# Patient Record
Sex: MALE | Race: WHITE
[De-identification: names, ages, dates, MRNs, and addresses within clinical notes are randomized per-mention and may not be internally consistent; named-entity substitution may affect disease eponyms.]

---

## 2020-11-22 ENCOUNTER — HOSPITAL ENCOUNTER (INPATIENT)
Dept: HOSPITAL 95 - ER | Age: 50
LOS: 1 days | Discharge: HOME | DRG: 143 | End: 2020-11-23
Attending: HOSPITALIST | Admitting: HOSPITALIST
Payer: OTHER GOVERNMENT

## 2020-11-22 VITALS — HEIGHT: 70 IN | WEIGHT: 236 LBS | BODY MASS INDEX: 33.79 KG/M2

## 2020-11-22 DIAGNOSIS — Z79.01: ICD-10-CM

## 2020-11-22 DIAGNOSIS — C62.90: ICD-10-CM

## 2020-11-22 DIAGNOSIS — Z87.891: ICD-10-CM

## 2020-11-22 DIAGNOSIS — J39.1: Primary | ICD-10-CM

## 2020-11-22 DIAGNOSIS — I82.220: ICD-10-CM

## 2020-11-22 DIAGNOSIS — D64.9: ICD-10-CM

## 2020-11-22 LAB
ALBUMIN SERPL BCP-MCNC: 2.9 G/DL (ref 3.4–5)
ALBUMIN/GLOB SERPL: 0.8 {RATIO} (ref 0.8–1.8)
ALT SERPL W P-5'-P-CCNC: 13 U/L (ref 12–78)
ANION GAP SERPL CALCULATED.4IONS-SCNC: 5 MMOL/L (ref 6–16)
AST SERPL W P-5'-P-CCNC: 10 U/L (ref 12–37)
BASOPHILS # BLD: 0.08 K/MM3 (ref 0–0.23)
BASOPHILS NFR BLD: 1 % (ref 0–2)
BILIRUB SERPL-MCNC: 0.5 MG/DL (ref 0.1–1)
BUN SERPL-MCNC: 19 MG/DL (ref 8–24)
CALCIUM SERPL-MCNC: 8.2 MG/DL (ref 8.5–10.1)
CHLORIDE SERPL-SCNC: 98 MMOL/L (ref 98–108)
CO2 SERPL-SCNC: 33 MMOL/L (ref 21–32)
CREAT SERPL-MCNC: 0.77 MG/DL (ref 0.6–1.2)
DEPRECATED RDW RBC AUTO: 53.5 FL (ref 35.1–46.3)
EOSINOPHIL # BLD: 0 K/MM3 (ref 0–0.68)
EOSINOPHIL NFR BLD: 0 % (ref 0–6)
ERYTHROCYTE [DISTWIDTH] IN BLOOD BY AUTOMATED COUNT: 18.1 % (ref 11.7–14.2)
GLOBULIN SER CALC-MCNC: 3.6 G/DL (ref 2.2–4)
GLUCOSE SERPL-MCNC: 106 MG/DL (ref 70–99)
HCT VFR BLD AUTO: 35.3 % (ref 37–53)
HGB BLD-MCNC: 11.2 G/DL (ref 13.5–17.5)
LYMPHOCYTES # BLD: 2.04 K/MM3 (ref 0.84–5.2)
LYMPHOCYTES NFR BLD: 21 % (ref 21–46)
MCHC RBC AUTO-ENTMCNC: 31.7 G/DL (ref 31.5–36.5)
MCV RBC AUTO: 83 FL (ref 80–100)
MONOCYTES # BLD: 0.17 K/MM3 (ref 0.16–1.47)
MONOCYTES NFR BLD: 2 % (ref 4–13)
NEUTS SEG # BLD MANUAL: 6.57 K/MM3 (ref 1.96–9.15)
NEUTS SEG NFR BLD MANUAL: 74 % (ref 41–73)
NRBC # BLD AUTO: 0 K/MM3 (ref 0–0.02)
NRBC BLD AUTO-RTO: 0 /100 WBC (ref 0–0.2)
PLATELET # BLD AUTO: 269 K/MM3 (ref 150–400)
POTASSIUM SERPL-SCNC: 3.5 MMOL/L (ref 3.5–5.5)
PROT SERPL-MCNC: 6.5 G/DL (ref 6.4–8.2)
SODIUM SERPL-SCNC: 136 MMOL/L (ref 136–145)
TOTAL CELLS COUNTED BLD: 100
VARIANT LYMPHS NFR BLD MANUAL: 2 % (ref 0–0)

## 2020-11-22 PROCEDURE — 0C9PXZZ DRAINAGE OF TONSILS, EXTERNAL APPROACH: ICD-10-PCS | Performed by: EMERGENCY MEDICINE

## 2020-11-22 NOTE — NUR
PT IS A 50/M, INDEPENDENT IN THE ROOM. ON ROOM AIR; NO TELE. ALERT AND
ORIENTED AND CAME HERE FOR L PHARANGEAL ABSCESS. DR LUGO WHO IS THE ENT ER.DR
DRAINED 4ML OF ABSCESS; AND FELT BETTER. PT PMH IS TESTICULAR CANCER; HE HAS
SCHEDULED CHEMO FOR TOMORROW BY DR HORNE. PT TAKING SEVERAL PAIN MEDS RELATED
TO CANCER. BED IS IN THE LOWEST POSITION AND CALL LIGHTS WITHIN REACH.

## 2020-11-22 NOTE — NUR
CALL TO HOSPITALIST DR. SUNG. PT HS MS CONTIN D/C'D WITHOUT EXPLAINATION. PT
TAKES THIS DOSE [id number] AT HOME FOR CHRONIC SEVERE PAIN RELATED TO CANCER.
DOSE ADDED BACK ONTO PT MED REGIMEN.

## 2020-11-23 LAB
ALBUMIN SERPL BCP-MCNC: 3.1 G/DL (ref 3.4–5)
ALBUMIN/GLOB SERPL: 0.8 {RATIO} (ref 0.8–1.8)
ALT SERPL W P-5'-P-CCNC: 13 U/L (ref 12–78)
ANION GAP SERPL CALCULATED.4IONS-SCNC: 5 MMOL/L (ref 6–16)
AST SERPL W P-5'-P-CCNC: 3 U/L (ref 12–37)
BASOPHILS # BLD AUTO: 0.04 K/MM3 (ref 0–0.23)
BASOPHILS NFR BLD AUTO: 0 % (ref 0–2)
BILIRUB SERPL-MCNC: 0.5 MG/DL (ref 0.1–1)
BUN SERPL-MCNC: 25 MG/DL (ref 8–24)
CALCIUM SERPL-MCNC: 8.6 MG/DL (ref 8.5–10.1)
CHLORIDE SERPL-SCNC: 100 MMOL/L (ref 98–108)
CO2 SERPL-SCNC: 32 MMOL/L (ref 21–32)
CREAT SERPL-MCNC: 0.8 MG/DL (ref 0.6–1.2)
DEPRECATED RDW RBC AUTO: 53.2 FL (ref 35.1–46.3)
EOSINOPHIL # BLD AUTO: 0.01 K/MM3 (ref 0–0.68)
EOSINOPHIL NFR BLD AUTO: 0 % (ref 0–6)
ERYTHROCYTE [DISTWIDTH] IN BLOOD BY AUTOMATED COUNT: 18.2 % (ref 11.7–14.2)
GLOBULIN SER CALC-MCNC: 4.1 G/DL (ref 2.2–4)
GLUCOSE SERPL-MCNC: 122 MG/DL (ref 70–99)
HCT VFR BLD AUTO: 38.2 % (ref 37–53)
HGB BLD-MCNC: 12.2 G/DL (ref 13.5–17.5)
IMM GRANULOCYTES # BLD AUTO: 0.2 K/MM3 (ref 0–0.1)
IMM GRANULOCYTES NFR BLD AUTO: 2 % (ref 0–1)
LYMPHOCYTES # BLD AUTO: 1.23 K/MM3 (ref 0.84–5.2)
LYMPHOCYTES NFR BLD AUTO: 10 % (ref 21–46)
MAGNESIUM SERPL-MCNC: 2 MG/DL (ref 1.6–2.4)
MCHC RBC AUTO-ENTMCNC: 31.9 G/DL (ref 31.5–36.5)
MCV RBC AUTO: 82 FL (ref 80–100)
MONOCYTES # BLD AUTO: 0.08 K/MM3 (ref 0.16–1.47)
MONOCYTES NFR BLD AUTO: 1 % (ref 4–13)
NEUTROPHILS # BLD AUTO: 10.81 K/MM3 (ref 1.96–9.15)
NEUTROPHILS NFR BLD AUTO: 88 % (ref 41–73)
NRBC # BLD AUTO: 0 K/MM3 (ref 0–0.02)
NRBC BLD AUTO-RTO: 0 /100 WBC (ref 0–0.2)
PLATELET # BLD AUTO: 291 K/MM3 (ref 150–400)
POTASSIUM SERPL-SCNC: 3.4 MMOL/L (ref 3.5–5.5)
PROT SERPL-MCNC: 7.2 G/DL (ref 6.4–8.2)
SODIUM SERPL-SCNC: 137 MMOL/L (ref 136–145)

## 2020-11-23 NOTE — NUR
SHIFT SUMMARY:
VSS. AFEB. AAOX4. ABLE TO COMMUNICATE NEEDS. DILAUDID X1 FOR BREAKTHROUGH PAIN
TONIGHT. PT STATES THROAT IS ONLY SOMEWHAT SORE, FEELS MUCH BETTER SINCE I & D
ON 11/22. PAIN IS IN PT'S "USUAL" LOCATION WHICH IS ALL OVER BODY AND
SPECIFICALLY LOWER BACK. APPEARS TO BE SLEEPING WELL THROUGH MUCH OF THE
NIGHT. IV ABT INFUSED PER ORDERS. PT PLANS TO HAVE CHEMO PER ROUTINE SCHEDULE
TODAY. NO ACUTE CHANGES OVERNIGHT. WILL CONT TO MONITOR.

## 2020-11-23 NOTE — NUR
PATIENT D/C'D TO HOME. DC INSTRUCTION AND EDUCATION DISUCUSSED WITH PATIENT
AND COPY PROVIDED. PATIENT DID NOT WANT THE RX FOR BISACODYL SUPPOSITORY SO IT
WAS NOT SENT TO HIS PHARMACY. MESSAGE LEFT AT DR. SWANSON'S OFFICE TO CALL
PATIENT WITH APPT TIME AND DATE. PATIENT DENIES ANY FURTHER QUESITONS AND
CONCENRS.

## 2020-11-25 ENCOUNTER — HOSPITAL ENCOUNTER (OUTPATIENT)
Dept: HOSPITAL 95 - ATC | Age: 50
Discharge: HOME | End: 2020-11-25
Attending: INTERNAL MEDICINE
Payer: OTHER GOVERNMENT

## 2020-11-25 DIAGNOSIS — Z79.899: ICD-10-CM

## 2020-11-25 DIAGNOSIS — C77.2: ICD-10-CM

## 2020-11-25 DIAGNOSIS — C62.11: Primary | ICD-10-CM

## 2020-11-25 DIAGNOSIS — Z79.01: ICD-10-CM

## 2020-12-01 ENCOUNTER — HOSPITAL ENCOUNTER (OUTPATIENT)
Dept: HOSPITAL 95 - ATC | Age: 50
Discharge: HOME | End: 2020-12-01
Attending: INTERNAL MEDICINE
Payer: OTHER GOVERNMENT

## 2020-12-01 DIAGNOSIS — C77.2: ICD-10-CM

## 2020-12-01 DIAGNOSIS — Z79.899: ICD-10-CM

## 2020-12-01 DIAGNOSIS — C62.11: ICD-10-CM

## 2020-12-01 DIAGNOSIS — Z79.01: ICD-10-CM

## 2020-12-01 DIAGNOSIS — Z48.00: Primary | ICD-10-CM

## 2020-12-01 DIAGNOSIS — Z91.018: ICD-10-CM

## 2020-12-07 ENCOUNTER — HOSPITAL ENCOUNTER (OUTPATIENT)
Dept: HOSPITAL 95 - ATC | Age: 50
Discharge: HOME | End: 2020-12-07
Attending: INTERNAL MEDICINE
Payer: OTHER GOVERNMENT

## 2020-12-07 DIAGNOSIS — Z79.899: ICD-10-CM

## 2020-12-07 DIAGNOSIS — Z79.01: ICD-10-CM

## 2020-12-07 DIAGNOSIS — Z45.2: Primary | ICD-10-CM

## 2020-12-07 DIAGNOSIS — C78.00: ICD-10-CM

## 2020-12-07 DIAGNOSIS — C78.6: ICD-10-CM

## 2020-12-07 DIAGNOSIS — Z48.00: ICD-10-CM

## 2020-12-07 DIAGNOSIS — C62.11: ICD-10-CM

## 2020-12-07 DIAGNOSIS — C77.2: ICD-10-CM

## 2020-12-07 LAB
ALBUMIN SERPL BCP-MCNC: 2.9 G/DL (ref 3.4–5)
ALBUMIN/GLOB SERPL: 0.8 {RATIO} (ref 0.8–1.8)
ALT SERPL W P-5'-P-CCNC: 13 U/L (ref 12–78)
ANION GAP SERPL CALCULATED.4IONS-SCNC: 4 MMOL/L (ref 6–16)
AST SERPL W P-5'-P-CCNC: 14 U/L (ref 12–37)
BASOPHILS # BLD: 0.08 K/MM3 (ref 0–0.23)
BASOPHILS NFR BLD: 2 % (ref 0–2)
BILIRUB SERPL-MCNC: 0.2 MG/DL (ref 0.1–1)
BUN SERPL-MCNC: 18 MG/DL (ref 8–24)
CALCIUM SERPL-MCNC: 8.9 MG/DL (ref 8.5–10.1)
CHLORIDE SERPL-SCNC: 108 MMOL/L (ref 98–108)
CO2 SERPL-SCNC: 29 MMOL/L (ref 21–32)
CREAT SERPL-MCNC: 0.88 MG/DL (ref 0.6–1.2)
DEPRECATED RDW RBC AUTO: 61 FL (ref 35.1–46.3)
EOSINOPHIL # BLD: 0 K/MM3 (ref 0–0.68)
EOSINOPHIL NFR BLD: 0 % (ref 0–6)
ERYTHROCYTE [DISTWIDTH] IN BLOOD BY AUTOMATED COUNT: 19.9 % (ref 11.7–14.2)
GLOBULIN SER CALC-MCNC: 3.8 G/DL (ref 2.2–4)
GLUCOSE SERPL-MCNC: 135 MG/DL (ref 70–99)
HCT VFR BLD AUTO: 36.3 % (ref 37–53)
HGB BLD-MCNC: 11 G/DL (ref 13.5–17.5)
LYMPHOCYTES # BLD: 2.59 K/MM3 (ref 0.84–5.2)
LYMPHOCYTES NFR BLD: 62 % (ref 21–46)
MCHC RBC AUTO-ENTMCNC: 30.3 G/DL (ref 31.5–36.5)
MCV RBC AUTO: 86 FL (ref 80–100)
METAMYELOCYTES # BLD MANUAL: 0.16 K/MM3 (ref 0–0)
METAMYELOCYTES NFR BLD MANUAL: 4 % (ref 0–0)
MONOCYTES # BLD: 0.54 K/MM3 (ref 0.16–1.47)
MONOCYTES NFR BLD: 13 % (ref 4–13)
MYELOCYTES # BLD MANUAL: 0.12 K/MM3 (ref 0–0)
MYELOCYTES NFR BLD MANUAL: 3 % (ref 0–0)
NEUTS SEG # BLD MANUAL: 0.66 K/MM3 (ref 1.96–9.15)
NEUTS SEG NFR BLD MANUAL: 16 % (ref 41–73)
NRBC # BLD AUTO: 0.02 K/MM3 (ref 0–0.02)
NRBC BLD AUTO-RTO: 0.5 /100 WBC (ref 0–0.2)
PLATELET # BLD AUTO: 258 K/MM3 (ref 150–400)
POTASSIUM SERPL-SCNC: 3.9 MMOL/L (ref 3.5–5.5)
PROT SERPL-MCNC: 6.7 G/DL (ref 6.4–8.2)
SODIUM SERPL-SCNC: 141 MMOL/L (ref 136–145)
TOTAL CELLS COUNTED BLD: 100

## 2020-12-14 ENCOUNTER — HOSPITAL ENCOUNTER (OUTPATIENT)
Dept: HOSPITAL 95 - ATC | Age: 50
Discharge: HOME | End: 2020-12-14
Attending: INTERNAL MEDICINE
Payer: OTHER GOVERNMENT

## 2020-12-14 DIAGNOSIS — C62.11: ICD-10-CM

## 2020-12-14 DIAGNOSIS — Z48.00: Primary | ICD-10-CM

## 2020-12-14 DIAGNOSIS — Z45.2: ICD-10-CM

## 2020-12-14 DIAGNOSIS — Z79.01: ICD-10-CM

## 2020-12-14 DIAGNOSIS — C78.6: ICD-10-CM

## 2020-12-14 DIAGNOSIS — C77.2: ICD-10-CM

## 2020-12-14 DIAGNOSIS — C78.00: ICD-10-CM

## 2020-12-14 DIAGNOSIS — Z79.899: ICD-10-CM

## 2020-12-14 DIAGNOSIS — Z87.891: ICD-10-CM

## 2020-12-14 DIAGNOSIS — Z91.018: ICD-10-CM

## 2020-12-14 LAB
ALBUMIN SERPL BCP-MCNC: 3.1 G/DL (ref 3.4–5)
ALBUMIN/GLOB SERPL: 0.8 {RATIO} (ref 0.8–1.8)
ALT SERPL W P-5'-P-CCNC: 15 U/L (ref 12–78)
ANION GAP SERPL CALCULATED.4IONS-SCNC: 4 MMOL/L (ref 6–16)
AST SERPL W P-5'-P-CCNC: 13 U/L (ref 12–37)
BASOPHILS # BLD: 0 K/MM3 (ref 0–0.23)
BASOPHILS NFR BLD: 0 % (ref 0–2)
BILIRUB SERPL-MCNC: 0.4 MG/DL (ref 0.1–1)
BUN SERPL-MCNC: 27 MG/DL (ref 8–24)
CALCIUM SERPL-MCNC: 8.6 MG/DL (ref 8.5–10.1)
CHLORIDE SERPL-SCNC: 103 MMOL/L (ref 98–108)
CO2 SERPL-SCNC: 30 MMOL/L (ref 21–32)
CREAT SERPL-MCNC: 0.89 MG/DL (ref 0.6–1.2)
DEPRECATED RDW RBC AUTO: 61.8 FL (ref 35.1–46.3)
EOSINOPHIL # BLD: 0 K/MM3 (ref 0–0.68)
EOSINOPHIL NFR BLD: 0 % (ref 0–6)
ERYTHROCYTE [DISTWIDTH] IN BLOOD BY AUTOMATED COUNT: 19.7 % (ref 11.7–14.2)
GLOBULIN SER CALC-MCNC: 3.7 G/DL (ref 2.2–4)
GLUCOSE SERPL-MCNC: 118 MG/DL (ref 70–99)
HCT VFR BLD AUTO: 36.3 % (ref 37–53)
HGB BLD-MCNC: 11.1 G/DL (ref 13.5–17.5)
LYMPHOCYTES # BLD: 1.03 K/MM3 (ref 0.84–5.2)
LYMPHOCYTES NFR BLD: 16 % (ref 21–46)
MCHC RBC AUTO-ENTMCNC: 30.6 G/DL (ref 31.5–36.5)
MCV RBC AUTO: 86 FL (ref 80–100)
MONOCYTES # BLD: 0.06 K/MM3 (ref 0.16–1.47)
MONOCYTES NFR BLD: 1 % (ref 4–13)
NEUTS BAND NFR BLD MANUAL: 2 % (ref 0–8)
NEUTS SEG # BLD MANUAL: 5.36 K/MM3 (ref 1.96–9.15)
NEUTS SEG NFR BLD MANUAL: 81 % (ref 41–73)
NRBC # BLD AUTO: 0 K/MM3 (ref 0–0.02)
NRBC BLD AUTO-RTO: 0 /100 WBC (ref 0–0.2)
PLATELET # BLD AUTO: 221 K/MM3 (ref 150–400)
POTASSIUM SERPL-SCNC: 4.4 MMOL/L (ref 3.5–5.5)
PROT SERPL-MCNC: 6.8 G/DL (ref 6.4–8.2)
SODIUM SERPL-SCNC: 137 MMOL/L (ref 136–145)
TOTAL CELLS COUNTED BLD: 100

## 2020-12-21 ENCOUNTER — HOSPITAL ENCOUNTER (OUTPATIENT)
Dept: HOSPITAL 95 - ATC | Age: 50
Discharge: HOME | End: 2020-12-21
Attending: INTERNAL MEDICINE
Payer: OTHER GOVERNMENT

## 2020-12-21 DIAGNOSIS — C78.6: ICD-10-CM

## 2020-12-21 DIAGNOSIS — C78.00: ICD-10-CM

## 2020-12-21 DIAGNOSIS — Z79.899: ICD-10-CM

## 2020-12-21 DIAGNOSIS — Z91.018: ICD-10-CM

## 2020-12-21 DIAGNOSIS — C62.11: ICD-10-CM

## 2020-12-21 DIAGNOSIS — Z79.01: ICD-10-CM

## 2020-12-21 DIAGNOSIS — Z87.891: ICD-10-CM

## 2020-12-21 DIAGNOSIS — C77.2: ICD-10-CM

## 2020-12-21 DIAGNOSIS — Z48.00: Primary | ICD-10-CM

## 2021-01-01 ENCOUNTER — HOSPITAL ENCOUNTER (INPATIENT)
Dept: HOSPITAL 95 - ER | Age: 51
LOS: 19 days | DRG: 207 | End: 2021-01-20
Attending: HOSPITALIST | Admitting: HOSPITALIST
Payer: OTHER GOVERNMENT

## 2021-01-01 VITALS — HEIGHT: 70 IN | WEIGHT: 215.61 LBS | BODY MASS INDEX: 30.87 KG/M2

## 2021-01-01 DIAGNOSIS — E44.0: ICD-10-CM

## 2021-01-01 DIAGNOSIS — Z86.718: ICD-10-CM

## 2021-01-01 DIAGNOSIS — D63.0: ICD-10-CM

## 2021-01-01 DIAGNOSIS — J96.01: ICD-10-CM

## 2021-01-01 DIAGNOSIS — J16.8: Primary | ICD-10-CM

## 2021-01-01 DIAGNOSIS — Z51.5: ICD-10-CM

## 2021-01-01 DIAGNOSIS — I48.0: ICD-10-CM

## 2021-01-01 DIAGNOSIS — Z66: ICD-10-CM

## 2021-01-01 DIAGNOSIS — Z78.1: ICD-10-CM

## 2021-01-01 DIAGNOSIS — Z87.891: ICD-10-CM

## 2021-01-01 DIAGNOSIS — C62.90: ICD-10-CM

## 2021-01-01 LAB
ALBUMIN SERPL BCP-MCNC: 2.2 G/DL (ref 3.4–5)
ALBUMIN/GLOB SERPL: 0.5 {RATIO} (ref 0.8–1.8)
ALT SERPL W P-5'-P-CCNC: 9 U/L (ref 12–78)
ANION GAP SERPL CALCULATED.4IONS-SCNC: 5 MMOL/L (ref 6–16)
AST SERPL W P-5'-P-CCNC: 11 U/L (ref 12–37)
BASOPHILS # BLD AUTO: 0.05 K/MM3 (ref 0–0.23)
BASOPHILS NFR BLD AUTO: 0 % (ref 0–2)
BILIRUB SERPL-MCNC: 0.5 MG/DL (ref 0.1–1)
BUN SERPL-MCNC: 7 MG/DL (ref 8–24)
CALCIUM SERPL-MCNC: 8.7 MG/DL (ref 8.5–10.1)
CHLORIDE SERPL-SCNC: 104 MMOL/L (ref 98–108)
CO2 SERPL-SCNC: 30 MMOL/L (ref 21–32)
CREAT SERPL-MCNC: 0.89 MG/DL (ref 0.6–1.2)
DEPRECATED RDW RBC AUTO: 59.2 FL (ref 35.1–46.3)
EOSINOPHIL # BLD AUTO: 0.15 K/MM3 (ref 0–0.68)
EOSINOPHIL NFR BLD AUTO: 1 % (ref 0–6)
ERYTHROCYTE [DISTWIDTH] IN BLOOD BY AUTOMATED COUNT: 19.2 % (ref 11.7–14.2)
GLOBULIN SER CALC-MCNC: 4.7 G/DL (ref 2.2–4)
GLUCOSE SERPL-MCNC: 129 MG/DL (ref 70–99)
HCT VFR BLD AUTO: 19.3 % (ref 37–53)
HCT VFR BLD AUTO: 27.9 % (ref 37–53)
HGB BLD-MCNC: 6.1 G/DL (ref 13.5–17.5)
HGB BLD-MCNC: 8.9 G/DL (ref 13.5–17.5)
IMM GRANULOCYTES # BLD AUTO: 0.59 K/MM3 (ref 0–0.1)
IMM GRANULOCYTES NFR BLD AUTO: 5 % (ref 0–1)
LYMPHOCYTES # BLD AUTO: 1.3 K/MM3 (ref 0.84–5.2)
LYMPHOCYTES NFR BLD AUTO: 12 % (ref 21–46)
MCHC RBC AUTO-ENTMCNC: 31.6 G/DL (ref 31.5–36.5)
MCV RBC AUTO: 84 FL (ref 80–100)
MONOCYTES # BLD AUTO: 1.37 K/MM3 (ref 0.16–1.47)
MONOCYTES NFR BLD AUTO: 12 % (ref 4–13)
NEUTROPHILS # BLD AUTO: 7.78 K/MM3 (ref 1.96–9.15)
NEUTROPHILS NFR BLD AUTO: 69 % (ref 41–73)
NRBC # BLD AUTO: 0.04 K/MM3 (ref 0–0.02)
NRBC BLD AUTO-RTO: 0.4 /100 WBC (ref 0–0.2)
PLATELET # BLD AUTO: 292 K/MM3 (ref 150–400)
POTASSIUM SERPL-SCNC: 3.4 MMOL/L (ref 3.5–5.5)
PROT SERPL-MCNC: 6.9 G/DL (ref 6.4–8.2)
SODIUM SERPL-SCNC: 139 MMOL/L (ref 136–145)

## 2021-01-01 PROCEDURE — U0003 INFECTIOUS AGENT DETECTION BY NUCLEIC ACID (DNA OR RNA); SEVERE ACUTE RESPIRATORY SYNDROME CORONAVIRUS 2 (SARS-COV-2) (CORONAVIRUS DISEASE [COVID-19]), AMPLIFIED PROBE TECHNIQUE, MAKING USE OF HIGH THROUGHPUT TECHNOLOGIES AS DESCRIBED BY CMS-2020-01-R: HCPCS

## 2021-01-01 PROCEDURE — C1751 CATH, INF, PER/CENT/MIDLINE: HCPCS

## 2021-01-01 PROCEDURE — C9113 INJ PANTOPRAZOLE SODIUM, VIA: HCPCS

## 2021-01-01 PROCEDURE — 30233N1 TRANSFUSION OF NONAUTOLOGOUS RED BLOOD CELLS INTO PERIPHERAL VEIN, PERCUTANEOUS APPROACH: ICD-10-PCS | Performed by: HOSPITALIST

## 2021-01-01 PROCEDURE — A9270 NON-COVERED ITEM OR SERVICE: HCPCS

## 2021-01-01 PROCEDURE — P9016 RBC LEUKOCYTES REDUCED: HCPCS

## 2021-01-01 NOTE — NUR
PT ADMITTED FROM ED 1510, A/O X4, AMBULATED TO BED- IRRITABLE, TOOK OFF O2 AND
WENT TO THE BATHROOM AFTER RN LEFT ROOM TO GET SOMETHING. MOM WAS IN THE ROOM
AT THE TIME. PT CAME BACK TO BED SEVERELY DYSPNIC, TURNED OXYMIZER UP FROM 8L
TO 15L TEMPERARY, SATS SEEN AS LOW AS 79%, TOOK 10 MIN TO COME BACK INTO LOW
90'S, LOWERED O2 BACK DOWN TO 10L OXYMIZER. PT SEELING BETTER, LUNGS CRACKEL
IN BASES AND TIGHT. CALLED RT TO SET UP CONT PULSE OX. VSS, WILL CONT TO NIECY
BLOOD TX WHILE GATHERING HISTORY.

## 2021-01-01 NOTE — NUR
TRANSFER NOTE
PT TX FROM MED FLOOR TO ICU FOR WORSENING DYSPNEA. PT ABLE TO STAND AND
TRANSFER TO ICU BED, BECOMES DYSPNEIC WITH MINIMAL EXERTION, SATS DECREASED TO
LOW 80'S WITH EXERTION. CURRENTLY ON ISOLATION FOR COVID PRECAUTIONS, PENDING
RESULTS FROM SENDOUT. RT IN ROOM UPON PT ARRIVAL, PLACED ON AIRVO, PT
TOLERATING WELL C O2 SATS >90%. LS DIMINISHED T/O. NSR ON THE MONITOR C
OCCASIONAL PVC'S. PT ORIENTED TO ROOM, NEEDS ADDRESSED. NO COMPLAINTS AT THIS
TIME.  WILL CONTINUE TO MONITOR AND REASSESS.

## 2021-01-01 NOTE — NUR
SUMMARY- PT A/O X3, USES CALL LIGHT. USING URINAL. TOLERATING FOOD AND FLUIDS.
MEDICATED WITH DILAUDID X1 1800 FOR BACK PAIN 6/10 WITH STATED RELEIF. I UNIT
OF PRBC'S COMPLETE. PT GETS SEVERE SOB WITH EXERTION AND DESATS, O2 DEMAND
INCREASES FROM 10-15L AND TAKES 10 MIN TO RECOVER FROM EPISODES. VSS, TEMP
99.9 AFTER BLOOD. REPORTED ALL TO DRAGAN FERNANDEZ RN.

## 2021-01-01 NOTE — NUR
1950 REPORT RECEIVED FROM BLANCHE BRAVO; PT O2 SAT 79% ON 15L OXIMIZER; RT AT
SIDE; PT VERY ANXIOUS AND UNABLE TO CATCH BREATH WHILE SITTING HIGH FOWLERS.
 
2010 PRITI BARKSDALE RN AT BEDSIDE WITH ORDERS TO MOVE PT TO ICU; PRITI WILL
INPUT TRANSFER ORDERS.
 
2035 REPORT GIVEN TO BLANCHE MORENO IN ICU; PT TO BE MOVED TO ICU-3.

## 2021-01-02 LAB
ALBUMIN SERPL BCP-MCNC: 1.9 G/DL (ref 3.4–5)
ALBUMIN/GLOB SERPL: 0.4 {RATIO} (ref 0.8–1.8)
ALT SERPL W P-5'-P-CCNC: 7 U/L (ref 12–78)
ANION GAP SERPL CALCULATED.4IONS-SCNC: 3 MMOL/L (ref 6–16)
AST SERPL W P-5'-P-CCNC: 14 U/L (ref 12–37)
BASOPHILS # BLD AUTO: 0.08 K/MM3 (ref 0–0.23)
BASOPHILS NFR BLD AUTO: 1 % (ref 0–2)
BILIRUB SERPL-MCNC: 0.5 MG/DL (ref 0.1–1)
BUN SERPL-MCNC: 10 MG/DL (ref 8–24)
CALCIUM SERPL-MCNC: 8.6 MG/DL (ref 8.5–10.1)
CHLORIDE SERPL-SCNC: 106 MMOL/L (ref 98–108)
CO2 SERPL-SCNC: 31 MMOL/L (ref 21–32)
CREAT SERPL-MCNC: 1.02 MG/DL (ref 0.6–1.2)
DEPRECATED RDW RBC AUTO: 55.8 FL (ref 35.1–46.3)
EOSINOPHIL # BLD AUTO: 0.17 K/MM3 (ref 0–0.68)
EOSINOPHIL NFR BLD AUTO: 2 % (ref 0–6)
ERYTHROCYTE [DISTWIDTH] IN BLOOD BY AUTOMATED COUNT: 18.6 % (ref 11.7–14.2)
FERRITIN SERPL-MCNC: 491 NG/ML (ref 26–388)
GLOBULIN SER CALC-MCNC: 4.3 G/DL (ref 2.2–4)
GLUCOSE SERPL-MCNC: 100 MG/DL (ref 70–99)
HCT VFR BLD AUTO: 26.8 % (ref 37–53)
HGB BLD-MCNC: 8.6 G/DL (ref 13.5–17.5)
IMM GRANULOCYTES # BLD AUTO: 0.39 K/MM3 (ref 0–0.1)
IMM GRANULOCYTES NFR BLD AUTO: 4 % (ref 0–1)
LYMPHOCYTES # BLD AUTO: 1.47 K/MM3 (ref 0.84–5.2)
LYMPHOCYTES NFR BLD AUTO: 16 % (ref 21–46)
MAGNESIUM SERPL-MCNC: 1.8 MG/DL (ref 1.6–2.4)
MCHC RBC AUTO-ENTMCNC: 32.1 G/DL (ref 31.5–36.5)
MCV RBC AUTO: 84 FL (ref 80–100)
MONOCYTES # BLD AUTO: 1.16 K/MM3 (ref 0.16–1.47)
MONOCYTES NFR BLD AUTO: 13 % (ref 4–13)
NEUTROPHILS # BLD AUTO: 5.88 K/MM3 (ref 1.96–9.15)
NEUTROPHILS NFR BLD AUTO: 64 % (ref 41–73)
NRBC # BLD AUTO: 0.05 K/MM3 (ref 0–0.02)
NRBC BLD AUTO-RTO: 0.5 /100 WBC (ref 0–0.2)
PH BLDA: 7.42 [PH] (ref 7.35–7.45)
PLATELET # BLD AUTO: 191 K/MM3 (ref 150–400)
POTASSIUM SERPL-SCNC: 4 MMOL/L (ref 3.5–5.5)
PROT SERPL-MCNC: 6.2 G/DL (ref 6.4–8.2)
PROT UR STRIP-MCNC: (no result) MG/DL
PROT UR STRIP-MCNC: (no result) MG/DL
SODIUM SERPL-SCNC: 140 MMOL/L (ref 136–145)
SP GR SPEC: 1.01 (ref 1–1.02)
SP GR SPEC: 1.01 (ref 1–1.02)
TIBC SERPL-MCNC: 143 UG/DL (ref 250–450)
UROBILINOGEN UR STRIP-MCNC: (no result) MG/DL
UROBILINOGEN UR STRIP-MCNC: (no result) MG/DL

## 2021-01-02 PROCEDURE — XW033E5 INTRODUCTION OF REMDESIVIR ANTI-INFECTIVE INTO PERIPHERAL VEIN, PERCUTANEOUS APPROACH, NEW TECHNOLOGY GROUP 5: ICD-10-PCS | Performed by: HOSPITALIST

## 2021-01-02 NOTE — NUR
SHIFT SUMMARY
PT REMAINS ALERT AND ORIENTED. TOLERATING AIRVO WELL @ 50L/ 70% c O2 SATS
>90%. PT FREQUENTLY REPOSITIONS SELF IN BED. STATES HE IS FEELING BETTER NOW
THAT HE IS NO LONGER SHOB AND IS ABLE TO TOLERATE THE AIRVO. PT ABLE TO USE
THE URINAL WITHOUT ASSISTANCE. CONTINUES TO HAVE PVC'S/ BIGEMINY. NO C/O CP.
PT CURRENTLY HAS NO COMPLAINTS. PT NOTIFIED MOTHER OF TRANSFER TO ICU, STATED
NO NEED FOR ME TO CALL AND MAKE HER WORRY. WILL CONTINUE TO MONITOR.

## 2021-01-02 NOTE — NUR
PT UPDATE...
 
AT APROX 1315 PT WAS EATING LUNCH, HIS O2 SATS DROPPED SIGNIFICANTLY DOWN TO
THE 70'S. PT WAS STOPPED FROM FINISHING EATING AND INSTRUCTED TO TAKE DEEP
BREATHS IN THROUGH HIS NOSE AND OUT OF HIS MOUTH, THE AIRVO WAS INCREASED TO
95% FIO2 AND HIS O2 SATS STILL DID NOT RECOVER. RT WAS CALLED AND A BIPAP WAS
BROUGHT INTO THE ROOM PT WAS PLACED ON THE CPAP SETTINGS AT 12 AND 85% FIO2.
HIS O2 SATS QUICKLY IMPROVED AND ARE STAYING >90%.
 
WILL CONTINUE TO MONITOR.

## 2021-01-02 NOTE — NUR
PT UPDATE....
 
PT IS NOW BACK ON THE AIRVO AT 50L AND 85% FIO2 KEEPING HIS O2 SATS>90% PT
REQUESTED TO TAKE THE CPAP MASK OFF FOR A BREAK. PT WAS MEDICATED FOR PAIN PER
EMAR. WILL CONTINUE TO MONITOR.

## 2021-01-02 NOTE — NUR
SHIFT SUMMARY....
 
AT 1730 THIS RN WAS IN THE PT'S ROOM GIVING 1700 MEDS AND DINNER, THE PT WAS
STABLE ON THE AIRVO AT 50L AND 85% FIO2 WITH O2 SATS>90% UNTIL HE TURNED
HIMSELF OVER IN THE BED AND PUSHED HIMSELF UP THE BED TO THE SITTING POSITION,
THIS MADE HIS SATS DROP DOWN TO THE 70'S AND HE WAS UNABLE TO RECOVER ON THE
AIRVO, THE CPAP WAS PUT ON AND WAS SET TO 12 % FIO2, PT WAS STILL
UNABLE TO RECOVER, RT WAS CALLED AND THE CPAP SETTINGS WERE CHANGED TO BIPAP
AT 14/8 %. PT HAS BEEN ON THIS SETTING ARPROX 10 MINS WITH O2 SATS
BETWEEN 82%-92%. DR. ALFARO UPDATED AND ORDER FOR PRECEDEX WAS OBTAINED. L/S
DIM T/O OTHER VS STABLE AT THIS TIME.
 
WILL CONTINUE TO MONITOR.

## 2021-01-02 NOTE — NUR
UPDATE
NOTIFIED DR ALFARO OF ABG RESULTS, DISCUSSED INABLITITY TO TAKE PO MEDICATIONS
AND PTS HYPOTENSION RELATING TO PAIN MANAGEMENT. FLUID BOLUS AND CONTINOUS NS
INFUSION ORDERED. WILL MONITOR PT TOLERANCE.

## 2021-01-02 NOTE — NUR
PT UPDATE...
 
RT WAS CALLED BACK IN THE ROOM D/T THE PT'S O2 SATS STILL BEING BELOW 88%
THE PRECEDEX WAS STARTED AND 50 MCG OF FENTANYL WAS GIVEN. THE RT CHANGED THE
BIPAP SETTINGS TO 12/14 % FIO2 CURRENTLY THE PT'S O2 SATS ARE 92%. THE
PT IS ON 0.3MCG OF PRECEDEX HE IS DROWSY BUT RESPONDS TO VERBAL STIMULI.

## 2021-01-02 NOTE — NUR
AM NOTE...
 
ASSUMED CARE OF PT APROX 0700, PT IS A&Ox4 AND WAS ADMITTED FOR PNA PT IS ON
THE AIRVO AT 50L AND 73% FIO2. PT'S O2 SATS WERE >90% BUT PT WOULD DESAT
QUICKLY DOWN TO THE LOW 80'S% WITH ACTIVITY AND COUGHING, AT AROUND 0900 PT
DESATTED AND WAS UNABLE TO RECOVER HIS FIO2 WAS INCREASED FROM 73% TO 85% AND
HIS O2 SATS WERE AT 91%. PT'S OTHER VS STABLE PT IS IN NSR W/OCC BIGEMINAL
PVCS. PT DENIES ANY CHEST PAIN/PRESSURE OR N/V. NO EDEMA NOTED ON ASSESSMENT.
BT PRESENT AND HYPOACTIVE, ABD IS TENDER TO PALP PER THE PT.
 
CALL LIGHT IN REACH WILL CONTINUE TO MONITOR.

## 2021-01-02 NOTE — NUR
PT UPDATE...
 
THIS RN IN THE ROOM TO GIVE MEDICATIONS, PT'S O2 SATS WERE DROPPING BUT THE
WAVE FORM WAS NOT GOOD, THE PROBE WAS PLACED ON THE PT'S TOE WITH A GOOD WAVE
FORM. PT'S O2 SATS WERE IN THE LOW TO MID 80'S, THE FIO2 OF THE AIRVO WAS
INCREASED FROM 75% FIO2 TO 90%, CURRENTLY THE PT'S O2 SATS ARE BETWEEN 89-94%
DR. ALFARO WAS CONSULTED AND MADE AWARE OF THE PT.
 
WILL CONTINUE TO MONITOR.

## 2021-01-02 NOTE — NUR
SHIFT ASSESSMENT
BEDSIDE REPORT RECV'D FROM TOMAS RN @ 1900. PT ALERT AND ORIENTED, DIFFICULT
TO UNDERSTAND DUE TO CPAP AND THIS NURSES PAPR.  FOLLOWING ALL COMMANDS.
TOLERATING CPAP FAIRLY WELL c O2 SATS > 88%. CPAP SETTINGS ARE BEING TITRATED.
NS/SINUS THUY ON THE CARDIAC MONITOR. PT DENIES ANY CP. VALENTINE CATH PATENT,
DRAINING YELLOW URINE. PRECEDEX GTT @ 0.5, NS TKO. DISCUSSING RESPIRATORY
CONCERNS WITH RT AND DR ALFARO, WILL CONTINUE TO MONITOR CLOSELY.

## 2021-01-03 LAB
ALBUMIN SERPL BCP-MCNC: 1.9 G/DL (ref 3.4–5)
ALBUMIN/GLOB SERPL: 0.4 {RATIO} (ref 0.8–1.8)
ALT SERPL W P-5'-P-CCNC: 9 U/L (ref 12–78)
ANION GAP SERPL CALCULATED.4IONS-SCNC: 9 MMOL/L (ref 6–16)
AST SERPL W P-5'-P-CCNC: 19 U/L (ref 12–37)
BASOPHILS # BLD AUTO: 0.04 K/MM3 (ref 0–0.23)
BASOPHILS NFR BLD AUTO: 0 % (ref 0–2)
BILIRUB SERPL-MCNC: 0.5 MG/DL (ref 0.1–1)
BUN SERPL-MCNC: 15 MG/DL (ref 8–24)
CALCIUM SERPL-MCNC: 8.4 MG/DL (ref 8.5–10.1)
CHLORIDE SERPL-SCNC: 107 MMOL/L (ref 98–108)
CO2 SERPL-SCNC: 25 MMOL/L (ref 21–32)
CREAT SERPL-MCNC: 0.91 MG/DL (ref 0.6–1.2)
DEPRECATED RDW RBC AUTO: 56.5 FL (ref 35.1–46.3)
EOSINOPHIL # BLD AUTO: 0 K/MM3 (ref 0–0.68)
EOSINOPHIL NFR BLD AUTO: 0 % (ref 0–6)
ERYTHROCYTE [DISTWIDTH] IN BLOOD BY AUTOMATED COUNT: 18.6 % (ref 11.7–14.2)
GLOBULIN SER CALC-MCNC: 4.8 G/DL (ref 2.2–4)
GLUCOSE SERPL-MCNC: 123 MG/DL (ref 70–99)
HCT VFR BLD AUTO: 27.5 % (ref 37–53)
HGB BLD-MCNC: 8.6 G/DL (ref 13.5–17.5)
IMM GRANULOCYTES # BLD AUTO: 0.32 K/MM3 (ref 0–0.1)
IMM GRANULOCYTES NFR BLD AUTO: 2 % (ref 0–1)
LYMPHOCYTES # BLD AUTO: 1.05 K/MM3 (ref 0.84–5.2)
LYMPHOCYTES NFR BLD AUTO: 7 % (ref 21–46)
MAGNESIUM SERPL-MCNC: 1.8 MG/DL (ref 1.6–2.4)
MCHC RBC AUTO-ENTMCNC: 31.3 G/DL (ref 31.5–36.5)
MCV RBC AUTO: 83 FL (ref 80–100)
MONOCYTES # BLD AUTO: 0.99 K/MM3 (ref 0.16–1.47)
MONOCYTES NFR BLD AUTO: 7 % (ref 4–13)
NEUTROPHILS # BLD AUTO: 12.46 K/MM3 (ref 1.96–9.15)
NEUTROPHILS NFR BLD AUTO: 84 % (ref 41–73)
NRBC # BLD AUTO: 0 K/MM3 (ref 0–0.02)
NRBC BLD AUTO-RTO: 0 /100 WBC (ref 0–0.2)
PH BLDA: 7.35 [PH] (ref 7.35–7.45)
PHOSPHATE SERPL-MCNC: 4.1 MG/DL (ref 2.5–4.9)
PLATELET # BLD AUTO: 185 K/MM3 (ref 150–400)
POTASSIUM SERPL-SCNC: 4 MMOL/L (ref 3.5–5.5)
PROT SERPL-MCNC: 6.7 G/DL (ref 6.4–8.2)
SODIUM SERPL-SCNC: 141 MMOL/L (ref 136–145)
VANCOMYCIN TROUGH SERPL-MCNC: 16.1 UG/ML (ref 5–10)

## 2021-01-03 PROCEDURE — 5A09357 ASSISTANCE WITH RESPIRATORY VENTILATION, LESS THAN 24 CONSECUTIVE HOURS, CONTINUOUS POSITIVE AIRWAY PRESSURE: ICD-10-PCS | Performed by: HOSPITALIST

## 2021-01-03 PROCEDURE — 0B9H8ZX DRAINAGE OF LUNG LINGULA, VIA NATURAL OR ARTIFICIAL OPENING ENDOSCOPIC, DIAGNOSTIC: ICD-10-PCS | Performed by: INTERNAL MEDICINE

## 2021-01-03 PROCEDURE — 5A1955Z RESPIRATORY VENTILATION, GREATER THAN 96 CONSECUTIVE HOURS: ICD-10-PCS | Performed by: INTERNAL MEDICINE

## 2021-01-03 PROCEDURE — 0BH18EZ INSERTION OF ENDOTRACHEAL AIRWAY INTO TRACHEA, VIA NATURAL OR ARTIFICIAL OPENING ENDOSCOPIC: ICD-10-PCS | Performed by: INTERNAL MEDICINE

## 2021-01-03 PROCEDURE — 0B9D8ZX DRAINAGE OF RIGHT MIDDLE LUNG LOBE, VIA NATURAL OR ARTIFICIAL OPENING ENDOSCOPIC, DIAGNOSTIC: ICD-10-PCS | Performed by: INTERNAL MEDICINE

## 2021-01-03 NOTE — NUR
CARE ASSUMED W/FROM BLANCHE FELTON. PT IS NOW RESTING ON THE VENTILATOR, AC
18/450/15/65. PROPOFOL IS INFUSING AT 50 MG,
 NOREPINEPHRINE AT 3, FENTANYL PCA CONTINUOUS INFUSION @ 25 MCG/HR.
PERIPHERAL IV STARTED IN ALMA, MAINTENANCE IV AND FENTANYL INFUSING THERE. PT
IS VERY SEDATED, ORAL CARE COMPLETED WITHOUT INCIDENT, WRISTS RESTRAINED PER
PROTOCOL. TURNED TO RIGHT SIDE, WITH PILLLOWS UNDER LEFT.

## 2021-01-03 NOTE — NUR
PT RESTING QUIETLY ON VENT-SATS 100%-FIO2 TITRATED DOWN TO 85%. MAP TRENDING
70'S-LEVOPHED DRIP TITRATED DOWN TO 3 MCG/MIN.

## 2021-01-03 NOTE — NUR
SHIFT ASSESSMENT
ASSUMED CARE OF PT @ 1900, REPORT RECV'D FROM JUNI MANCIA. PT INTUBATED AND
SEDATED. PROPOFOL @ 50MCG/KG/MIN, FENTANYL PCA @ 25MCG/HR. VENT SETTINGS:
AC-18/450/15/55% c O2 SAT >95%. PT TOLERATING THE VENT WELL, APPEARS TO BE
ADEQUATELY SEDATED. PICC LINE IN YUMIKO AND IV ALMA PATENT, INFUSING. NS ON THE
CARDIAC MONITOR. BP STABLE. TUBE FEED @ GOAL RATE. VALENTINE CATH PATENT,
DRAINING CLEAR YELLOW URINE. WILL CONTINUE TO MONITOR.

## 2021-01-03 NOTE — NUR
PT INITIATED CALL SYSTEM. PT CRYING OUT-WRIPPED OFF BIPAP MASK, BP CUFF, AND
SPO2 MONITOR. PT STATES THAT HE AWAKENED AND FELT LIKE HE WAS "DROWING" WITH
THE BIPAP MASK ON. PT REPORTS 91/0 BACK AND ABDOMINAL PAIN. MED WITH FENTANYL
50 MCG IVP AND TITRATED PRECEDEX UP TO 0.7 MCG. LUNGS TIGHT AND DIMINISHED
WITH A FEW COARSE RHONCHI TO THE LEFT. SATS 83-85% ON FIO2 100% RR 44
UTILIZING ACCESSORY MUSCLES TO BREATH. DR ALFARO CONTACTED AND UPDATED TO PT
CURRENT STATUS AND INCREASED RESPIRATORY DISTRESS. PT AWARE THAT INTUBATION IS
IMMINENT AND HE GAVE VERBAL CONSENT. PT DID REQUEST THAT HIS MOTHER BE
CONTACTED-CHARGE RN DID NOTIFY HER.

## 2021-01-03 NOTE — NUR
TONYA BEGAN THE SHIFT AWAKING IN A FRIGHT,AGREEING TO BE INTUBATED (SEE
NOTES). SINCE INTUBATION HE HAS BEEN SEDATED ON PROPOFOL AT 50MCG/KG/HR, HE
WAS ON LEVOPHED BRIEFLY UP TO 5, TITRATED DOWN TO 2, BEEN ON STANDBY SINCE
EARLY AFTERNOON. HE IS ON THE PCA CONTINUOUS INFUSION OF FENTANYL 25MCG/HR
WITH A KVO INFUSION IN THE LEFT UPPER ARM. THE PICC LINE HAS THE PROPOFOL AND
MAINTENANCE FLUIDS FOR THE ANTIBIOTICS AS WELL. HE HAS BEEN TURNED AND
REPOSITIONED Q2H, TOLERATED WITH A DOSE OF ATIVAN BEFORE, SOME COUGHING WHEN
TURNED, MINIMAL RETURN. TUBE FEEDING IS CONTINUOUS AT 40ML/HR WITH 30ML
FLUSHES Q4HR. RESIDUAL WAS 60, HAD BEEN ON 20ML/HR, TURNED UP AT 1700. VALENTINE
WITH GOOD OUTPUT AFTER FLUID BOLUSES. RESTRAINTS REMAIN ON FOR SAFETY PER
PROTOCOL.

## 2021-01-03 NOTE — NUR
CONTINUATION OF PREVIOUS NOTE
 
PT EVENTUALLY WAS ABLE TO CALM DOWN ENOUGH TO CATCH HIS BREATH. ONCE HIS RR
AND O2 SATS NORMALIZED, WE DISCUSSED MORE THOROUGHLY THE PTS CONDITION AND THE
TREATMENT AS WELL AS THE THEORY/ SCIENCE BEHIND THE CPAP AND MEDICATIONS HE IS
RECEIVING. PT STATED HE GREATLY APPRECIATED THE THEORY/SCIENCE BEING EXPLAINED
TO HIM. PT STATED "MY BRAIN WORKS DIFFERENTLY THAN MOST, IF I CAN HEAR THE
SCIENCE BEHIND A TREATMENT, I CAN CONTROL MY ANXIETY".
WE DISCUSSED WHAT JUST HAPPENED. PT STATED HE WOKE SCARED, WORRIED, UNSURE OF
WHAT IS GOING ON. BEGAN HAVING AN ATTACK, PT STATED HE SUFFERS FROM PTSD
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 PT NOW CALM, TOLERATING THE
MEDICATIONS AND CPAP, STATES "I'M LOOKING FORWARD TO GETTING THE AIRVO BACK
ON". WILL CONTINUE TO MONITOR.

## 2021-01-03 NOTE — NUR
UPDATE
PT CONTINUED TO TOLERATE CPAP AND MEDICATIONS UNTIL AM CHEST X-RAY. PT BEGAN
HAVING A COUGHING FIT WHICH LASTED APPROXIMATELY 30 MINUTES WITH O2 SATS
DROPPING TO THE LOW 80'S. THIS NURSE SPENT APROXIMATLEY AN HOUR WITH THE PT
AFTER THE X-RAY CALMING/ TALKING TO HIM. PT SATS IMPROVED TO THE LOW 90'S
ONCE STIMULATION WAS DECREASED. WILL CONTINUE TO MONITOR.

## 2021-01-03 NOTE — NUR
SHIFT SUMMARY
NO SIGNIFICANT CHANGES FROM LAST UPDATE. PT RESTING AFTER X-RAY. PRECEDEX
CONTINUES AT 0.5 MCG/KG/HR. MEDICATING WITH PRN FENTANYL Q1.5-2HRS FOR PAIN
AND ANXIETY. CPAP @ 16/ 100% c O2 SAT OF 97/98% WHILE PT IS SEDATE. WILL
CONTINUE TO MONITOR. REPORT TO ONCOMING NURSE.

## 2021-01-03 NOTE — NUR
LEVOPHED DRIP @ 5 MCG/MIN-MAP 60-65. PT MED WITH ATIVAN 4 MG IVP X1 AND
TITRATED PROPOFOL DRIP DOWN TO 50 MCG/KG/MIN. FENTANYL CONTINUOUS @ 25 MCG/HR.
PT LESS RESTLESS AND TOLERATING MECHANICAL VENTILATION WELL. PEEP INCREASED TO
15@1005 BY RT.

## 2021-01-03 NOTE — NUR
UPDATE
THIS NURSE WAS NOTIFIED FROM CHERIE MANCIA WHILE ON MY LUNCH BREAK THAT PT WAS
DESATURATING AND BECOMING TACHYPNEIC WITH A RR UP TO 60. PT TOLD NURSE HE WAS
IN PAIN. UPON ARRIVAL TO PTS ROOM, PT WAS SITTING UPRIGHT WITH RR FROM 30-50 c
O2 SATS IN THE MID TO HIGH 80'S. PRN FENTANYL GIVEN AND PRECEDEX TITRATED. PTS
RR SLOWY DECREASED WITH MEDICATON ADMIN. PT ALSO ASKING "WHAT IS GOING ON,
IT'S SO HARD TO BREATH". PT UPDATED ON HIS CONDITON AND COACHED THROUGH
RELAXATION TECHNIQUES. PT FEELS LIKE THE "MACHINE" IS NOT ALLOWING HIM TO TAKE
DEEP BREATHS. I EXPLAINED TO PT THE NEED FOR CPAP, PT UNDERSTANDING AND
REALIZES HE BECOMES ANXIOUS AND PAINFUL AT TIMES. PT CURRENTLY HAS AN O2 SAT
OF 92% c RR OF 17. WILL CONTINUE TO MONITOR RR AND PAIN.

## 2021-01-03 NOTE — NUR
MAP TRENDING LESS THAN 60. LEVOPHED DRIP INITIATED. ADDITIONAL 1 LITER LR TO
BE GIVEN AFTER CURRENT BOLUS  COMPLETED. ABG DRAWN.

## 2021-01-03 NOTE — NUR
DR. ALFARO ARRIVED AT BEDSIDE. PT CONTINUES IN RESPIRATORY DISTRESS. RSI DONE.
DR. ALFARO MED PT WITH PROPOFOL 5 CC IVP @0848-WITH MININAL EFFECT-PT GIVEN AN
ADDITIONAL 5CC OF PROPOFOL IV BY DR. ALFARO.-PT BEING BAGGED AND APPEARED TO
RELAX-HOWEVER, WHEN DR. ALFARO ATTEMPTED PLACE LARYNGOSCOPE/GLIDESCOPE-PT
BECAME VERY AGGITATED AND STARTED TO GAG. PT GIVEN SUCCS 100MG IVP @0851. #8.0
ETT PLACED-27 @ TEETH. ETT TO VENT: AC18//PEEP10/FIO2 100% PROPOFOL
DRIP INITIATED @ 25 MCG/KG/MIN. SBP TRENDING 80'S-NS 1000CC BOLUS INITIATED
@0847.PT VERY AGITATED AND DIFFICULT TO VENTILATE-SATS TO 70'S AT TIMES. MED
WITH FENTANYL 100MCG IVP X 1 @0920 AND 0930 FOR PAIN AND SEDATION
ADJUNCT-PROPOFOL DRIP TITRATED UP  MCG/KG/MIN, YET PT STILL AGITATED AND
FIGHTING VENTILATOR. PT MED WITH ATIVAN 2 MG IVP @ 0907 AND 0918 AS SEDATION
ADJUNCT. OGT PLACED-CXR DONE TO CONFIRM BOTH ETT AND OGT PLACEMENT. XARELTO 20
MG GIVEN VIA OGT PER DR. ALFARO VERBAL ORDER.

## 2021-01-03 NOTE — NUR
UPDATE
PT CONTINUES TO TOLERATE THE CPAP WHILE SLEEPING c O2 SATS >90%. BP STABLE
AFTER FLUID BOLUS. CURRENTLY IN SINUS THUY c OCCASIONAL PVC'S, TITRATING
PRECEDEX, SEE FLOW SHEET. PT ABLE TO ADJUST SELF IN BED WHEN NEEDED, BUT
CAUTIOUS DUE TO SHOB. WILL CONTINUE TO MONITOR.

## 2021-01-04 LAB
ALBUMIN SERPL BCP-MCNC: 1.7 G/DL (ref 3.4–5)
ALBUMIN/GLOB SERPL: 0.4 {RATIO} (ref 0.8–1.8)
ALT SERPL W P-5'-P-CCNC: 9 U/L (ref 12–78)
ANION GAP SERPL CALCULATED.4IONS-SCNC: 2 MMOL/L (ref 6–16)
AST SERPL W P-5'-P-CCNC: 8 U/L (ref 12–37)
BASOPHILS # BLD AUTO: 0.02 K/MM3 (ref 0–0.23)
BASOPHILS NFR BLD AUTO: 0 % (ref 0–2)
BILIRUB SERPL-MCNC: 0.2 MG/DL (ref 0.1–1)
BUN SERPL-MCNC: 20 MG/DL (ref 8–24)
CALCIUM SERPL-MCNC: 8.1 MG/DL (ref 8.5–10.1)
CHLORIDE SERPL-SCNC: 111 MMOL/L (ref 98–108)
CO2 SERPL-SCNC: 30 MMOL/L (ref 21–32)
CREAT SERPL-MCNC: 0.8 MG/DL (ref 0.6–1.2)
DEPRECATED RDW RBC AUTO: 58.6 FL (ref 35.1–46.3)
EOSINOPHIL # BLD AUTO: 0.01 K/MM3 (ref 0–0.68)
EOSINOPHIL NFR BLD AUTO: 0 % (ref 0–6)
ERYTHROCYTE [DISTWIDTH] IN BLOOD BY AUTOMATED COUNT: 19 % (ref 11.7–14.2)
GLOBULIN SER CALC-MCNC: 4.2 G/DL (ref 2.2–4)
GLUCOSE SERPL-MCNC: 160 MG/DL (ref 70–99)
HCT VFR BLD AUTO: 23.5 % (ref 37–53)
HGB BLD-MCNC: 7.3 G/DL (ref 13.5–17.5)
IMM GRANULOCYTES # BLD AUTO: 0.28 K/MM3 (ref 0–0.1)
IMM GRANULOCYTES NFR BLD AUTO: 3 % (ref 0–1)
LYMPHOCYTES # BLD AUTO: 0.95 K/MM3 (ref 0.84–5.2)
LYMPHOCYTES NFR BLD AUTO: 9 % (ref 21–46)
MAGNESIUM SERPL-MCNC: 2.1 MG/DL (ref 1.6–2.4)
MCHC RBC AUTO-ENTMCNC: 31.1 G/DL (ref 31.5–36.5)
MCV RBC AUTO: 84 FL (ref 80–100)
MONOCYTES # BLD AUTO: 0.63 K/MM3 (ref 0.16–1.47)
MONOCYTES NFR BLD AUTO: 6 % (ref 4–13)
NEUTROPHILS # BLD AUTO: 8.38 K/MM3 (ref 1.96–9.15)
NEUTROPHILS NFR BLD AUTO: 82 % (ref 41–73)
NRBC # BLD AUTO: 0.02 K/MM3 (ref 0–0.02)
NRBC BLD AUTO-RTO: 0.2 /100 WBC (ref 0–0.2)
PH BLDA: 7.43 [PH] (ref 7.35–7.45)
PHOSPHATE SERPL-MCNC: 2.7 MG/DL (ref 2.5–4.9)
PLATELET # BLD AUTO: 127 K/MM3 (ref 150–400)
POTASSIUM SERPL-SCNC: 4 MMOL/L (ref 3.5–5.5)
PROT SERPL-MCNC: 5.9 G/DL (ref 6.4–8.2)
SODIUM SERPL-SCNC: 143 MMOL/L (ref 136–145)

## 2021-01-04 NOTE — NUR
SHIFT SUMMARY
 
NO ACUTE CHANGES THIS SHIFT. PT REMAINS INTUBATED AND SEDATED. VENT SETTINGS
AC 18/450/12/40%. PROPOFOL @40 MCG/KG/MIN, FENTANYL PCA @25 MCG/HR. PT'S
MOTHER IN TO VISIT THIS AFTERNOON, UPDATED WITH PT'S STATUS.
WILL REPORT TO ONCOMING NURSE.

## 2021-01-04 NOTE — NUR
ASSUMED CARE OF PT, REPORT RCV'D FROM BLANCHE MORENO.
 
PT INTUBATED AND SEDATED. VENT SETTINGS AC 18/450/15/45%. PROPOFOL @40
MCG/KG/MIN AND FENTANYL PCA 25 MCG/HR. LUNG SOUNDS COARSE T/O, SMALL AMOUNT OF
THICK SPUTUM FROM ETT. PT OPENS EYES TO VERBAL STIMULI, FAILS TO FOLLOW
COMMANDS. VITAL HIGH PROTEIN @ 45 ML/HR (GOAL) WITH 30 ML Q4 FLUSH.
VSS AT THIS TIME. SEE FULL SHIFT ASSESSMENT.

## 2021-01-04 NOTE — NUR
ASSUMPTION OF CARE
 
PT INTUBATED AND SEDATED ON PROPOFOL AND FENTANYL GTT, PRN ATIVAN AVAILABLE.
PT AROUSES TO VERBAL STIMULI AND FOLLOWS DIRECTIONS. VENT SET TO AC
18/450/12/40%. MONITOR SHOWS SINUS RHYTHM WITH HR 40'S-50'S, BP STABLE. SKIN
OVERALL C/D/I. VALENTINE IN PLACE DRAINING YELLOW URINE. OG IN PLACE, TF INFUSING
@ 30ml/hr, LOW RESIDUALS.

## 2021-01-04 NOTE — NUR
SHIFT SUMMARY
PT REMAINS INTUBATED AND SEDATED. NO SIGNIFICANT CHANGES T/O THE SHIFT.VENT
SETTINGS AC: 18, 450, 15, 45% c O2 SATS >90%. PERFORMED SHORT SEDATION
VACATION THIS AM. PT ABLE TO FOLLOW SIMPLE COMMANDS, BUT QUICKLY BECAME
ANXIOUS AND STARTED TO COUGH. ABLE TO CALM PT DOWN WHILE PROPOFOL GTT STARTED.
O2 SATS DECREASED TO LOW 90'S WHEN PT BEGAN TO WAKEN. VSS REMAINED STABLE.
WILL CONTINUE TO MONITOR UNTIL REPORT TO ONCOMING NURSE.

## 2021-01-05 LAB
ALBUMIN SERPL BCP-MCNC: 1.8 G/DL (ref 3.4–5)
ANION GAP SERPL CALCULATED.4IONS-SCNC: 3 MMOL/L (ref 6–16)
BASOPHILS # BLD: 0 K/MM3 (ref 0–0.23)
BASOPHILS NFR BLD: 0 % (ref 0–2)
BUN SERPL-MCNC: 24 MG/DL (ref 8–24)
CALCIUM SERPL-MCNC: 8.1 MG/DL (ref 8.5–10.1)
CHLORIDE SERPL-SCNC: 109 MMOL/L (ref 98–108)
CO2 SERPL-SCNC: 30 MMOL/L (ref 21–32)
CREAT SERPL-MCNC: 0.73 MG/DL (ref 0.6–1.2)
DEPRECATED RDW RBC AUTO: 60.3 FL (ref 35.1–46.3)
EOSINOPHIL # BLD: 0 K/MM3 (ref 0–0.68)
EOSINOPHIL NFR BLD: 0 % (ref 0–6)
ERYTHROCYTE [DISTWIDTH] IN BLOOD BY AUTOMATED COUNT: 19.4 % (ref 11.7–14.2)
GLUCOSE SERPL-MCNC: 179 MG/DL (ref 70–99)
HCT VFR BLD AUTO: 25.1 % (ref 37–53)
HGB BLD-MCNC: 7.6 G/DL (ref 13.5–17.5)
LYMPHOCYTES # BLD: 0.91 K/MM3 (ref 0.84–5.2)
LYMPHOCYTES NFR BLD: 8 % (ref 21–46)
MCHC RBC AUTO-ENTMCNC: 30.3 G/DL (ref 31.5–36.5)
MCV RBC AUTO: 85 FL (ref 80–100)
METAMYELOCYTES # BLD MANUAL: 0.11 K/MM3 (ref 0–0)
METAMYELOCYTES NFR BLD MANUAL: 1 % (ref 0–0)
MONOCYTES # BLD: 0.68 K/MM3 (ref 0.16–1.47)
MONOCYTES NFR BLD: 6 % (ref 4–13)
NEUTS BAND NFR BLD MANUAL: 3 % (ref 0–8)
NEUTS SEG # BLD MANUAL: 9.73 K/MM3 (ref 1.96–9.15)
NEUTS SEG NFR BLD MANUAL: 82 % (ref 41–73)
NRBC # BLD AUTO: 0.05 K/MM3 (ref 0–0.02)
NRBC BLD AUTO-RTO: 0.4 /100 WBC (ref 0–0.2)
PHOSPHATE SERPL-MCNC: 3.5 MG/DL (ref 2.5–4.9)
PLATELET # BLD AUTO: 144 K/MM3 (ref 150–400)
POTASSIUM SERPL-SCNC: 4 MMOL/L (ref 3.5–5.5)
SODIUM SERPL-SCNC: 142 MMOL/L (ref 136–145)
TOTAL CELLS COUNTED BLD: 100
VANCOMYCIN TROUGH SERPL-MCNC: 17.3 UG/ML (ref 5–10)

## 2021-01-05 NOTE — NUR
No significant changes with patient since am note. Dr brooke in room assessing
patient and no new orders. Calling oncology to let know in hospital and
possible reaction to meication. VSS, and remains sedated.

## 2021-01-05 NOTE — NUR
Repositioned patient. Reduced Propofol to 50 mcg/kg min and he awakens to
verbal stimuli and knods to questions, Oral care done. Silveira continues to
drain to gravity green/yellow urine adequate amounts. VSS, See EMR. Dr Price
talked to Dr Le and states only will visit if requested. No New orders.

## 2021-01-05 NOTE — NUR
ASSUMED CARE AT 1900
PT LAYING IN BED INTUBATED WITH VENT SETTINGS AC 18, , PEEP 12, FIO2
45%. PT HAS COUGHING EPISODES WITH LARGE AMOUNTS OF CLEAR SECREATIONS
SUCTIONED THROUGH ETT, AND CLEAR ORAL SECREATIONS. PT IS ALERT AND ABLE
TO NOD TO YES/NO QUESTIONS, POINT TO OBJECTS, AND FOLLOWS DIRECTIONS; PROPOFOL
INFUSING AT 50MCG/KG/MIN. SINUS THUY WITH RATE 50-60'S. -130'S.
AFIBRILE. VHP INFUSING VIA OG AT 30ML/HR (GOAL) WITH 30ML WATER FLUSHES Q4HR.
VALENTINE PATENT AND DRAINING TO GRAVITY. PCA FENTANYL INFUSING AT 25MCG/HR. SEE
SHIFT ASSESSMENT FOR FULL ASSESSMENT.

## 2021-01-05 NOTE — NUR
Patients FiO2 has been reduced to 30% and sats >90. Also Dr Albert jamesed LR to
50 ml/hr. No other significant changes, Propofol remains at 50 mcg/kg/min.

## 2021-01-05 NOTE — NUR
Received report from Erlinda MANCIA. Patient is intubated and sedated. He has 8.0
ET and 27 cm at lips with vent settings AC 18, , FiO2 40%, Peep 12.p and
sats 92%.He has OG in place and infusing at goal rate of 30ml/hr of Vital High
Protein and 30 ml water flushes Q4.He has 18ga IV in LAC dressing intact and
site WNL's and is infusing Fentanyl 25 mcg/hr. He also has PICC line to YUMIKO
dressing intact and site WNL's and is infusing Propofol at 60 mcg/kg/min, NS
TKO, LR at 100 ml/hr. He has 14Fr. Silveira draining to gravity. he has bilateral
LE SCDE's in place. He has bilateral soft wrist restraints for patient and
line safety. VSS, HR 55 SB, systolics 120's.

## 2021-01-05 NOTE — NUR
Patient continues to rest on sedation. Vent setting AC 18, , FiO2 35%,
Peep 12 and sats >90%. ET 8.0 and 27 cm at lips. 18ga IV to LAC and infusing
Fentanyl 25 mcg/hr. He also has PICC to YUMIKO infusing Propofol at 50
mcg/kg/min, LR at 50 ml/hr and NS TKO for Abx. 14 Fr cheema draining to gravity
green/yellow urine and 1700 ml's. he has one brown/orange stool at end of
shift, approx 150ml pastey stool. He has SCD's bilaterally to LE's. He has
bilateral soft wrist restraints in place. He has OG infusing VHP at 30ml/hr
goal rate and 30ml water flushes Q4. Paatient continues to awaken to verbal
stimuli and knod yes and no to questions.

## 2021-01-05 NOTE — NUR
SHIFT SUMMARY
 
NO ACUTE CHANGES THIS SHIFT. PT REMAINS INTUBATED, VENT SET TO AC 18/450 PEEP
12 AND FIO2 35-50%, PT DOES NOT TOLERATE DECREASE IN SEDATION, BEGINS COUGHING
VENT AND SITTING UP IN BED WITH O2 SATURATIONS DECREASING 79-83% WITH VERY
SLOW RECOVERY. PRN ATIVAN ADMINISTERD x3 THIS SHIFT. MONITOR SHOWS SINUS
RHYTHM WITH HR 40'S-50'S, BP STABLE. TF INF @ 30ml/hr, 1 BM THIS SHIFT. VALENTINE
IN PLACE WITH GOOD OUTPUT.

## 2021-01-05 NOTE — NUR
Patient continues to rest on Propofol 50 mcg/kg/min. Mother by and gave update
and spent some time at bedside. LR reduced to 50ml/hr. He continues to awaken
to verbal stimuli and knods yes and no to questions.Increased FiO2 to 35% and
sats >90%.

## 2021-01-06 LAB
ALBUMIN SERPL BCP-MCNC: 1.7 G/DL (ref 3.4–5)
ANION GAP SERPL CALCULATED.4IONS-SCNC: 4 MMOL/L (ref 6–16)
BASOPHILS # BLD: 0 K/MM3 (ref 0–0.23)
BASOPHILS NFR BLD: 0 % (ref 0–2)
BUN SERPL-MCNC: 27 MG/DL (ref 8–24)
CALCIUM SERPL-MCNC: 7.8 MG/DL (ref 8.5–10.1)
CHLORIDE SERPL-SCNC: 111 MMOL/L (ref 98–108)
CO2 SERPL-SCNC: 32 MMOL/L (ref 21–32)
CREAT SERPL-MCNC: 0.87 MG/DL (ref 0.6–1.2)
DEPRECATED RDW RBC AUTO: 62.5 FL (ref 35.1–46.3)
EOSINOPHIL # BLD: 0 K/MM3 (ref 0–0.68)
EOSINOPHIL NFR BLD: 0 % (ref 0–6)
ERYTHROCYTE [DISTWIDTH] IN BLOOD BY AUTOMATED COUNT: 19.6 % (ref 11.7–14.2)
GLUCOSE SERPL-MCNC: 100 MG/DL (ref 70–99)
HCT VFR BLD AUTO: 24.7 % (ref 37–53)
HGB BLD-MCNC: 7.5 G/DL (ref 13.5–17.5)
LYMPHOCYTES # BLD: 1.05 K/MM3 (ref 0.84–5.2)
LYMPHOCYTES NFR BLD: 11 % (ref 21–46)
MCHC RBC AUTO-ENTMCNC: 30.4 G/DL (ref 31.5–36.5)
MCV RBC AUTO: 86 FL (ref 80–100)
METAMYELOCYTES # BLD MANUAL: 0.19 K/MM3 (ref 0–0)
METAMYELOCYTES NFR BLD MANUAL: 2 % (ref 0–0)
MONOCYTES # BLD: 0.19 K/MM3 (ref 0.16–1.47)
MONOCYTES NFR BLD: 2 % (ref 4–13)
MYELOCYTES # BLD MANUAL: 0.38 K/MM3 (ref 0–0)
MYELOCYTES NFR BLD MANUAL: 4 % (ref 0–0)
NEUTS SEG # BLD MANUAL: 7.76 K/MM3 (ref 1.96–9.15)
NEUTS SEG NFR BLD MANUAL: 81 % (ref 41–73)
NRBC # BLD AUTO: 0.2 K/MM3 (ref 0–0.02)
NRBC BLD AUTO-RTO: 2.1 /100 WBC (ref 0–0.2)
PHOSPHATE SERPL-MCNC: 3.4 MG/DL (ref 2.5–4.9)
PLATELET # BLD AUTO: 130 K/MM3 (ref 150–400)
POTASSIUM SERPL-SCNC: 3.5 MMOL/L (ref 3.5–5.5)
SODIUM SERPL-SCNC: 147 MMOL/L (ref 136–145)
TOTAL CELLS COUNTED BLD: 100

## 2021-01-06 NOTE — NUR
Received report from Cheri MANCIA. Patient is intubated and sedated. He has 8.0
ET and 27 cm at lips with vent settings of AC 18, , FiO2 55%, PEEP 12.0
and sats >90%. He awakens to verbal stimuli and knods yes and no and uses hand
jestures. He has 18ga IV to LAC dressing intact and site WNL's and is infusing
Fentanyl at 25 mcg/hr. He also has PICC line to YUMIKO dressing intact and site
WNL's and is infusing Propofol at 50 mcg/kg/min, LR at 50 ml/hr, NS TKO. He
has OG in place infusing VHP at 30 ml/hr goal rate and 30 ml water flushes Q4.
He has bilateral SCD's in place to LE's, There is 14Fr Silveira draining to
gravity gree/yellow urine. He has bilateral soft wrist restraint in place for
patient and line/tube safety. Oral care, cath care and repositioning done.

## 2021-01-06 NOTE — NUR
Patient continues to rest most of time with intermitent poeriods of agitation
fom coughing and medicating with Ativan when not resolving. Propofol remainws
at 50mcg/kg/min, LR at 50 ml/hr and NS TKO. 8. ET and 27 cm at lips with vent
settings of AC 18, , FiO2 60% and PEEP of 12 and sats >90%. OG in place
and continues VHP at goal rate of 30ml/hr and 30 mlwater flushes Q$. SCD in
place bilateral LE's. He has bilateral soft wrist restraints to protect lines
and tubes. He continues to communicate with hand jestures and nods.

## 2021-01-06 NOTE — NUR
Needed to medicate for coughing agiatation and increase FiO2 briefly to keep
sats >90%. He is currently resting on sedation. Dr. Kaba was in room
assessing and no current new orders. VSS, See EMR. No vent, and or gtt's
setting changes.

## 2021-01-06 NOTE — NUR
Patient received ativan 2 MG for agitation and coughing spell. No changes in
Vent or gtt settings. He continues to awaken with care and able to communicate
yes and no. He is resting quietly on sedation of Propofol 50 mcg/kg/min.

## 2021-01-06 NOTE — NUR
END OF SHIFT SUMMARY
PT CONT TO BE INTUBATED WITH VENT SETTINGS AC 18, , PEEP 12, FIO2 55%.
PT HAS HAD EPISODES OF COUGHING WITH LARGE AMOUNTS OF ORAL SECREATIONS, AND
ETT SECREATIONS. ATIVAN GIVEN TWICE DUE TO AXIOUSNESS AND AGGITATION. PT IS
ABLE TO ANSWER YES/NO QUESTIONS, POINT TO OBJECTS, AND FOLLOWS DIRECTIONS.
PROPOFOL INFUSING AT 50MCG/KG/MIN. PCA FENTANYL 25MCG/HR. AFIBRILE. HR
50-60'S. -130'S. VHP INFUSING VIA OG AT 30ML/HR WITH 30ML WATER FLUSHES
Q4HR. VALENTINE IN PLACE AND DRAINING TO GRAVITY. YUMIKO PICC DRESSING C/D/I. BED
BATH COMPLETED THIS SHIFT. WILL REPORT TO AM RN WHEN AVAILABLE.

## 2021-01-06 NOTE — NUR
SHIFT ASSESSMENT
ASSUMED CARE OF PT @ 1900, REPORT RECV'D FROM ROCHELLE MANCIA. PT INTUBATED AND
SEDATED BUT RESPONDS TO VERBAL STIMULI. WILL SQUEEZE HANDS AND SHAKE HEAD.
PROPOFOL GTT @ 50MCG. PCA FENTANYL @ 25MCG/HR. PRN ATIVAN. VENT SETTINGS:
AC-18/450/60/12 c O2 SATS >90%. TF c VHP @ GOAL. NS/SINUS THUY ON THE CARDIAC
MONITOR. VALENTINE CATH DRAINING GREEN/YELLOW URINE. WILL CONTINUE TO MONITOR.

## 2021-01-07 LAB
ALBUMIN SERPL BCP-MCNC: 1.6 G/DL (ref 3.4–5)
ANION GAP SERPL CALCULATED.4IONS-SCNC: 5 MMOL/L (ref 6–16)
BASOPHILS # BLD: 0 K/MM3 (ref 0–0.23)
BASOPHILS NFR BLD: 0 % (ref 0–2)
BUN SERPL-MCNC: 25 MG/DL (ref 8–24)
CALCIUM SERPL-MCNC: 7.8 MG/DL (ref 8.5–10.1)
CHLORIDE SERPL-SCNC: 108 MMOL/L (ref 98–108)
CO2 SERPL-SCNC: 31 MMOL/L (ref 21–32)
CREAT SERPL-MCNC: 0.87 MG/DL (ref 0.6–1.2)
DEPRECATED RDW RBC AUTO: 62.1 FL (ref 35.1–46.3)
EOSINOPHIL # BLD: 0.33 K/MM3 (ref 0–0.68)
EOSINOPHIL NFR BLD: 3 % (ref 0–6)
ERYTHROCYTE [DISTWIDTH] IN BLOOD BY AUTOMATED COUNT: 19.9 % (ref 11.7–14.2)
GLUCOSE SERPL-MCNC: 113 MG/DL (ref 70–99)
HCT VFR BLD AUTO: 25.6 % (ref 37–53)
HGB BLD-MCNC: 7.8 G/DL (ref 13.5–17.5)
LYMPHOCYTES # BLD: 1.68 K/MM3 (ref 0.84–5.2)
LYMPHOCYTES NFR BLD: 15 % (ref 21–46)
MAGNESIUM SERPL-MCNC: 1.7 MG/DL (ref 1.6–2.4)
MCHC RBC AUTO-ENTMCNC: 30.5 G/DL (ref 31.5–36.5)
MCV RBC AUTO: 86 FL (ref 80–100)
METAMYELOCYTES # BLD MANUAL: 0.11 K/MM3 (ref 0–0)
METAMYELOCYTES NFR BLD MANUAL: 1 % (ref 0–0)
MONOCYTES # BLD: 0.78 K/MM3 (ref 0.16–1.47)
MONOCYTES NFR BLD: 7 % (ref 4–13)
MYELOCYTES # BLD MANUAL: 0.22 K/MM3 (ref 0–0)
MYELOCYTES NFR BLD MANUAL: 2 % (ref 0–0)
NEUTS BAND NFR BLD MANUAL: 3 % (ref 0–8)
NEUTS SEG # BLD MANUAL: 8.07 K/MM3 (ref 1.96–9.15)
NEUTS SEG NFR BLD MANUAL: 69 % (ref 41–73)
NRBC # BLD AUTO: 0.09 K/MM3 (ref 0–0.02)
NRBC BLD AUTO-RTO: 0.8 /100 WBC (ref 0–0.2)
PH BLDV: 7.53 [PH] (ref 7.34–7.37)
PHOSPHATE SERPL-MCNC: 4.2 MG/DL (ref 2.5–4.9)
PHOSPHATE SERPL-MCNC: 4.7 MG/DL (ref 2.5–4.9)
PLATELET # BLD AUTO: 133 K/MM3 (ref 150–400)
POTASSIUM SERPL-SCNC: 3.2 MMOL/L (ref 3.5–5.5)
POTASSIUM SERPL-SCNC: 3.4 MMOL/L (ref 3.5–5.5)
SODIUM SERPL-SCNC: 144 MMOL/L (ref 136–145)
TOTAL CELLS COUNTED BLD: 100

## 2021-01-07 NOTE — NUR
CONTINUES NSR, BP STABLE. SEDATE, CALM, AWAKENS EASILY TO ANY STIMULUS.
TOLERATING VENT SETTINGS. PICC INTACT. DR. FRANCES HERE. PEEP DECREASED TO 10.
CONTINUE TO MONITOR

## 2021-01-07 NOTE — NUR
SHIFT SUMMARY
PT REMAINS INTUBATED AND SEDATED, FOLLOWING SIMPLE COMMANDS. VENT SETTINGS
CHANGED THIS AM SECONDARY TO VBG RESULTS PER DR FRANCES. VENT-AC: 12/450/60/12.
LS REMAIN DIMINISHED IN LOWERS. PROPOFOL TITRATED TO A MAX OF 70MCG/KG/MIN DUE
TO PT PULLING AT RESTRAINTS AND HAVING COUGHING BOUTS. ATIVAN GIVEN MULTIPLE
TIMES THIS SHIFT SECONDARY TO COUGHING BOUTS. CURRENTLY PROPOFOL @
60MCG/KG/MIN. VALENTINE CATH DRAINING YELLOW/GREEN URINE. PT HAD TWO LOOSE BM'S
THIS SHIFT. NO OTHER CHANGES. WILL CONTINUE TO MONITOR.

## 2021-01-07 NOTE — NUR
DECREASED PROPOFOL, PT AWAKE, ABLE TO NOD HEAD TO QUESTIONS, UNCOMFORTABLE,
INCREASED PROPOFOL FOR SEDATION TO 60 MCG/KG/MIN. REPOSITIONED. AFIB
NOW-120-140'S, BP STABLE. DR. FRANCES HERE-LABS ORDERED. DIURESING WELL

## 2021-01-07 NOTE — NUR
ASSSESSMENT-
PT SEDATED WITH PROPOFOL AT 60 MCG/KG/MIN, MOVES WITH ANY STIMULATION, DOESN'T
FOLLOW COMMANDS. GRIMACES, FENTANYL GTT AT 25 MCG/HR.. LR 75 CC/HR. NS TKO.
RIGHT ARM PICC INTACT. NSR. ORALLY INTUBATED, TUBE SECURE, TOLERATING VENT.
LUNGS COARSE THROUGHOUT. ABDOMEN LARGE, SOFT. TUBE FEEDING PIVOT AT GOAL 30
CC/HR, 10 CC RESIDUAL. UO VIA VALENTINE, NO LEAKAGE NOTED. REPOSITIONED. BILATERAL
WRIST RESTRAINTS ON FOR SAFETY, SELF EXTUBATION RISK.

## 2021-01-07 NOTE — NUR
REPOSITIONED, VSS. UO GOOD VIA VALENTINE. ORDERS TO DC IVF.
DR. FRANCES HERE-UPDATED. DECREASED TO 50%, SATURATIONS STABLE.

## 2021-01-07 NOTE — NUR
SHIFT ASSESSMENT
ASSUMED CARE OF PT @ 1900, REPORT RECV'D FROM ARJUN MANCIA. PT INTUBATED AND
SEDATED. WILL RESPOND TO VERBAL STIMULI, FOLLOWS SIMPLE COMMNANDS, SQUEEZES
BOTH HANDS AND SHAKES HEAD YES/NO. PROPOFOL GTT @60MCG/KG/MIN, FENTANYL PCA @
25MCG. VENT SETTINGS: AC-14/450/50/10 c O2 SATS >90%. TF @ GOAL. VALENTINE CATH
PATENT DRAINING TO GRAVITY. RECTAL TUBE DRAINING LIQUID STOOL.
 
DURING ASSESSMENT PT HAD A COUGHING BOUT, SATS DECREASED TO THE LOW 80'S. DR FRANCES IN THE ROOM, FIO2 TEMPORARILY SET @ 100%. 4MG ATIVAN GIVEN. ONCE PT
CALMED BACK DOWN, FIO2 WAS TITRATED BACK DOWN TO 50% c O2 >90%. ORDERS PLACED
FOR PRN FENTANYL AS A COUGH SUPPRESANT. WILL CONTINUE TO MONITOR.

## 2021-01-07 NOTE — NUR
POTASSIUM AND MAGNESIUM INFUSIONS STARTED. HAD CONVERTED BACK TO SR, BP
STABLE. ABLE TO OPEN EYES TO NAME, NODS HEAD. UNABLE TO TOLERATE LAYING TO
LEFT SIDE-SATURATIONS DECREASED TO 83%, RECOVERED WITH REPOSITIONING.
TOLERATES LAYING TO RIGHT SIDE AND SUPINE.

## 2021-01-08 LAB
ANION GAP SERPL CALCULATED.4IONS-SCNC: 3 MMOL/L (ref 6–16)
BASOPHILS # BLD: 0 K/MM3 (ref 0–0.23)
BASOPHILS NFR BLD: 0 % (ref 0–2)
BUN SERPL-MCNC: 21 MG/DL (ref 8–24)
CALCIUM SERPL-MCNC: 7.9 MG/DL (ref 8.5–10.1)
CHLORIDE SERPL-SCNC: 106 MMOL/L (ref 98–108)
CO2 SERPL-SCNC: 33 MMOL/L (ref 21–32)
CREAT SERPL-MCNC: 0.82 MG/DL (ref 0.6–1.2)
DEPRECATED RDW RBC AUTO: 60.9 FL (ref 35.1–46.3)
EOSINOPHIL # BLD: 0.43 K/MM3 (ref 0–0.68)
EOSINOPHIL NFR BLD: 3 % (ref 0–6)
ERYTHROCYTE [DISTWIDTH] IN BLOOD BY AUTOMATED COUNT: 19.8 % (ref 11.7–14.2)
GLUCOSE SERPL-MCNC: 118 MG/DL (ref 70–99)
HCT VFR BLD AUTO: 27 % (ref 37–53)
HGB BLD-MCNC: 8.2 G/DL (ref 13.5–17.5)
LYMPHOCYTES # BLD: 1.45 K/MM3 (ref 0.84–5.2)
LYMPHOCYTES NFR BLD: 10 % (ref 21–46)
MAGNESIUM SERPL-MCNC: 1.8 MG/DL (ref 1.6–2.4)
MCHC RBC AUTO-ENTMCNC: 30.4 G/DL (ref 31.5–36.5)
MCV RBC AUTO: 86 FL (ref 80–100)
METAMYELOCYTES # BLD MANUAL: 0.29 K/MM3 (ref 0–0)
METAMYELOCYTES NFR BLD MANUAL: 2 % (ref 0–0)
MONOCYTES # BLD: 0.29 K/MM3 (ref 0.16–1.47)
MONOCYTES NFR BLD: 2 % (ref 4–13)
MYELOCYTES # BLD MANUAL: 0.14 K/MM3 (ref 0–0)
MYELOCYTES NFR BLD MANUAL: 1 % (ref 0–0)
NEUTS BAND NFR BLD MANUAL: 2 % (ref 0–8)
NEUTS SEG # BLD MANUAL: 11.91 K/MM3 (ref 1.96–9.15)
NEUTS SEG NFR BLD MANUAL: 80 % (ref 41–73)
NRBC # BLD AUTO: 0.02 K/MM3 (ref 0–0.02)
NRBC BLD AUTO-RTO: 0.1 /100 WBC (ref 0–0.2)
PH BLDV: 7.51 [PH] (ref 7.34–7.37)
PHOSPHATE SERPL-MCNC: 3.5 MG/DL (ref 2.5–4.9)
PLATELET # BLD AUTO: 131 K/MM3 (ref 150–400)
POTASSIUM SERPL-SCNC: 3.7 MMOL/L (ref 3.5–5.5)
SODIUM SERPL-SCNC: 142 MMOL/L (ref 136–145)
TOTAL CELLS COUNTED BLD: 100
VANCOMYCIN TROUGH SERPL-MCNC: 21 UG/ML (ref 5–10)

## 2021-01-08 NOTE — NUR
SHIFT SUMMARY
PT REMAINS INTUBATED AND SEDATED. VENT SETTINGS AC 14/450/12/70%. PROPOFOL GTT
30 MCG/KG/MIN, FENTANYL GTT 25 MCG/HR, AND PRECEDEX 0.7 MCG/KG/HR ADDED THIS
SHIFT. PT WAKES TO VERBAL STIMULI, FOLLOWS SIMPLE COMMANDS. UNABLE TO ANSWER
YES/NO QUESTIONS. LUNGS CLEAR. SMALL AMOUNT OF THIN, WHITE SECRETIONS THROUGH
ETT. ABD ROUND, SOFT NON TENDER. BT X 4. VHP CONTINUES AT 15 ML/HR c 30 ML
FLUSHES q4. VALENTINE PATENT, DRAINING CLEAR YELLOW URINE TO GRAVITY. PT CHANGED
TO AFIB, RATE 110-130'S. DR PIERRE NOTIFIED. BP STABLE. WILL CONTINUE TO
MONITOR UNTIL REPORT TO ONCOMING NURSE.

## 2021-01-08 NOTE — NUR
Pt resting in bed and is intubated. Spoke with Bedside RN Edilma and
discussed case. Pt requiring significant vent support and sedation.
 
Called and spoke with Pt's mother Toña. Provided update and established
rapport. Toña reports plan to visit Pt today. Answered question. Toña
expresses appreciation of call.
 
Palliative Care will remain available.

## 2021-01-08 NOTE — NUR
ASSUMED CARE
BEDSIDE REPORT FROM TIFFANIE MANCIA AT 0700. PT INTUBATED AND SEDATED. VENT SETTINGS
AC 14/450/10/60%. PROPOFOL GTT 60 MCG/KG/HR, FENTANYL 25 MCG/HR. PT RESPONDS
TO NOXIOUS STIMULI. DOES NOT FOLLOW COMMANDS. COUGHS AND PULLS HEAD FROM CARE.
PT DESATS DURING THESE EPISODES, MID 80'S. LUNGS CLEAR. SMALL AMOUNT OF THIN
WHITE SECERTIONS THROUGH ETT. ABD SOFT, ROUND, NON TENDER. BT X 4. VHP AT 15
ML/HR c 30 ML FLUSHES q4. 20 ML RESIDUALS THIS AM. VALENTINE PATENT, DRAINING TO
GRAVITY. RECTAL TUBE IN PLACE, LIQUID BROWN STOOL OUT. PICC TO LILIANA, DRESSING
C/D/I. VSS. WILL CONTINUE TO MONITOR.

## 2021-01-08 NOTE — NUR
ASSUMED CARE:
 
PT INTUBATED AND SEDATED ON PROPOFOL, PRECEDEX, AND FENTANYL. VENT SETTINGS
ARE 14/450/12/70%. TOLERATING WELL. VSS. PT HAS HAD EPISODES OF RUNS OF AFIB
BUT CARDIOVERTS SELF BACK INTO NSR. LUNGS CLEAR. VALENTINE IN PLACE. RECTAL TUBE
IN PLACE. TF RUNNING THROUGH OGT AT 15MLS/HR. PT TOLERATING WELL.

## 2021-01-08 NOTE — NUR
SHIFT SUMMARY
PT REMAINS INTUBATED AND SEDATED. WILL FOLLOW SIMPLE COMMANDS IN RESPONSE TO
VERBAL STIMULI. PROPOFOL CONTINUES AT 60MCG/KG/MIN. VENT SETTINGS:
AC-14/450/10/50. FI02 TITRATED DUE TO SATS REMAINING BELOW 88% WHILE PT IS ON
HIS R SIDE, DURING THOSE TIMES FIO2 WAS TITRATED TO 60%. FIO2 ABLE TO TITRATE
BACK DOWN TO 50% WHILE PT IS ON HIS L SIDE AND BACK. PT HAD MULTIPLE COUGHING
BOUTS T/O THE NIGHT WHICH WERE TREATED WITH PRN FENTANYL AND ATIVAN.  NS/SINUS
THUY ON THE CARDIAC MONITOR. RECTAL TUBE REMAINS PATENT, DRAINING BROWN,
LOOSE STOOL. VALENTINE CATH DRAINING LIGHT GREEN URINE. AM VANCO TROUGH BACK AT
21.0, PHARMACIST NOTIFIED. WILL CONTINUE TO MONITOR.

## 2021-01-09 LAB
ANION GAP SERPL CALCULATED.4IONS-SCNC: 4 MMOL/L (ref 6–16)
BASOPHILS # BLD AUTO: 0.14 K/MM3 (ref 0–0.23)
BASOPHILS # BLD: 0 K/MM3 (ref 0–0.23)
BASOPHILS NFR BLD AUTO: 1 % (ref 0–2)
BASOPHILS NFR BLD: 0 % (ref 0–2)
BUN SERPL-MCNC: 26 MG/DL (ref 8–24)
CALCIUM SERPL-MCNC: 8.4 MG/DL (ref 8.5–10.1)
CHLORIDE SERPL-SCNC: 106 MMOL/L (ref 98–108)
CO2 SERPL-SCNC: 32 MMOL/L (ref 21–32)
CREAT SERPL-MCNC: 0.9 MG/DL (ref 0.6–1.2)
DEPRECATED RDW RBC AUTO: 61.7 FL (ref 35.1–46.3)
EOSINOPHIL # BLD AUTO: 0.34 K/MM3 (ref 0–0.68)
EOSINOPHIL # BLD: 0.49 K/MM3 (ref 0–0.68)
EOSINOPHIL NFR BLD AUTO: 2 % (ref 0–6)
EOSINOPHIL NFR BLD: 3 % (ref 0–6)
ERYTHROCYTE [DISTWIDTH] IN BLOOD BY AUTOMATED COUNT: 19.6 % (ref 11.7–14.2)
GLUCOSE SERPL-MCNC: 152 MG/DL (ref 70–99)
HCT VFR BLD AUTO: 29.5 % (ref 37–53)
HGB BLD-MCNC: 8.9 G/DL (ref 13.5–17.5)
IMM GRANULOCYTES # BLD AUTO: 1.84 K/MM3 (ref 0–0.1)
IMM GRANULOCYTES NFR BLD AUTO: 11 % (ref 0–1)
LYMPHOCYTES # BLD AUTO: 1.32 K/MM3 (ref 0.84–5.2)
LYMPHOCYTES # BLD: 1.64 K/MM3 (ref 0.84–5.2)
LYMPHOCYTES NFR BLD AUTO: 8 % (ref 21–46)
LYMPHOCYTES NFR BLD: 10 % (ref 21–46)
MAGNESIUM SERPL-MCNC: 1.8 MG/DL (ref 1.6–2.4)
MCHC RBC AUTO-ENTMCNC: 30.2 G/DL (ref 31.5–36.5)
MCV RBC AUTO: 87 FL (ref 80–100)
METAMYELOCYTES # BLD MANUAL: 1.15 K/MM3 (ref 0–0)
METAMYELOCYTES NFR BLD MANUAL: 7 % (ref 0–0)
MONOCYTES # BLD AUTO: 0.83 K/MM3 (ref 0.16–1.47)
MONOCYTES # BLD: 0.82 K/MM3 (ref 0.16–1.47)
MONOCYTES NFR BLD AUTO: 5 % (ref 4–13)
MONOCYTES NFR BLD: 5 % (ref 4–13)
MYELOCYTES # BLD MANUAL: 0.16 K/MM3 (ref 0–0)
MYELOCYTES NFR BLD MANUAL: 1 % (ref 0–0)
NEUTROPHILS # BLD AUTO: 11.99 K/MM3 (ref 1.96–9.15)
NEUTROPHILS NFR BLD AUTO: 73 % (ref 41–73)
NEUTS BAND NFR BLD MANUAL: 2 % (ref 0–8)
NEUTS SEG # BLD MANUAL: 12.18 K/MM3 (ref 1.96–9.15)
NEUTS SEG NFR BLD MANUAL: 72 % (ref 41–73)
NRBC # BLD AUTO: 0 K/MM3 (ref 0–0.02)
NRBC BLD AUTO-RTO: 0 /100 WBC (ref 0–0.2)
PH BLDA: 7.47 [PH] (ref 7.35–7.45)
PHOSPHATE SERPL-MCNC: 3.4 MG/DL (ref 2.5–4.9)
PLATELET # BLD AUTO: 132 K/MM3 (ref 150–400)
POTASSIUM SERPL-SCNC: 3.9 MMOL/L (ref 3.5–5.5)
SODIUM SERPL-SCNC: 142 MMOL/L (ref 136–145)
TOTAL CELLS COUNTED BLD: 100
VANCOMYCIN TROUGH SERPL-MCNC: 18.3 UG/ML (ref 5–10)

## 2021-01-09 NOTE — NUR
SHIFT SUMMARY:
 
PT IS CURRENTLY SEDATED D/T INCREASED AGITATION, COUGHING/GAGGING AROUND TUBE
THAT CAUSED DESATS. FIO2 INCREASED %. PROPOFOL IS CURRENTLY AT 50MCG,
PRECEDEX AT 0.7, FENTANYL GTT AT 25MCG. PT DID REQUIRE A COUPLE DOSES OF
ATIVAN. AFEBRILE T/O NIGHT BUT PT WAS DIAPHORETIC AT TIMES. PT IN NSR WITH HR
IN THE 60S, SBP IN THE 90S. CARDIZEM IS AT 5MG/HR. VALENTINE AND RECTAL TUBE IN
PLACE. TF INFUSING AT 15MLS/HR. VENT SETTINGS ARE 14/450/12/85%. TOLERATING
WELL AT THIS TIME. WILL PASS REPORT TO ONCOMING RN

## 2021-01-09 NOTE — NUR
SHIFT SUMMARY
PT REMAINS INTUBATED AND SEDATED. VENT SETTINGS AC 14/450/12/65%. PROPOFOL GTT
50 MCG/KG/MIN, PRECEDEX GTT 1.4 MCG/KG/HR, FENTANYL 25 MCG/HR. NO SEDATION
VACATION THIS SHIFT PER DR PIERRE. RASS -4. LUNGS CLEAR. SMALL AMOUNT OF
SECRETIONS THROUGH ETT. VHP AT GOAL OF 15 ML/HR c 30 ML FLUSHES q4 HR. 100-190
ML RESIDUALS THIS SHIFT. ABD SOFT, ROUND, NON TENDER. BT X 4. VALENTINE PATENT,
DRAINING LINA URINE TO GRAVITY, 1450 ML THIS SHIFT. RECTAL TUBE PATENT,
DRAINING TO GRAVITY. PT REMAINED IN NSR ENTIRE SHIFT. BP STABLE. WILL CONTINUE
TO MONITOR UNTIL REPORT TO ONCOMING NURSE.

## 2021-01-09 NOTE — NUR
ASSUMED CARE NOTE/ UPDATE
PT LAYING IN BED INTUBATED WITH VENT SETTINGS AC 14, , PEEP 12, FIO2
65%. PT DOES NOT FOLLOW DIRECTIONS BUT HAS A GAG REFLEX. PRECEDEX INFUSING AT
1.4MCG/KG/HR. PROPOFOL INFUSING AT 50MCG/KG/MIN. FENTANYL PCA INFUSING AT
25MCG/HR. HR WAS 70-80 NSR; 2000 RHYTHM CHANGED TO AN AFIB RHYTHM WITH RATE
110-150. PT CONT TO BE IN THIS RHYTHM FOR OVER AN HOUR, DR PIERRE NOTIFIED AND
ORDERS TO RESTART CARDIZEM GTT AND A BOLUS DOES OF CARDIZEM GIVEN. CARDIZEM
STARTED AT 5MG/HR; RATE CONT TO -150; SBP THEN <80 WITH MAP <50;
CARDIZEM THEN PLACED ON SB. DR PIERRE NOTIFIED AGAIN, NEW ORDERS FOR
PHENYLEPHRINE GIVEN. VHP INFUSING VIA OG AT 15ML/HR WITH 30ML WATER FLUSHES
Q4HR. RECTAL TUBE AND VALENTINE IN PLACE AND DRAINING TO GRAVITY. SEE SHIFT
ASSESSMENT FOR FULL ASSESSMENT.

## 2021-01-09 NOTE — NUR
PT WAS HAVING RUNS OF AFIB WITH -140S. LABS DRAWN EARLY AND K+, PHOS,
MAG CAME BACK NORMAL. WILL CONTINUE TO MONITOR AND PASS ON TO DAYSHIFT

## 2021-01-09 NOTE — NUR
ASSUMED CARE
REPORT FROM KEN MANCIA. PT INTUBATED AND SEDATED. VENT SETTINGS AC
14/450/12/85%. PROPOFOL GTT 50 MCG/KG/MIN, PRECEDEX 0.7 MCG/KG/HR, FENTANYL
25 MCG/HR. PT WAKES c VERBAL STIMULI, AGITATED c CARE. COUGH, SWALLOW, GAG
REFLEX PRESENT. PT INTERMITTANTLY SITS UP IN BED, KICKS LEGS. MEDICATED c
PRNS. LUNGS CLEAR, SCANT SECRETIONS THROUGH ETT. ABD ROUND, SOFT, NON TENDER.
BT X 4. VHP AT GOAL 15 ML/HR c 30 ML FLUSH q 4HR. 120 ML RESIDUALS THIS AM.
VALENTINE PATENT, DRAINING TO GRAVITY, CONTINUING TO DIURESE TODAY. RECTAL TUBE IN
PLACE, DRAINING LIQUID BROWN STOOL TO GRAVITY. PICC TO [facility], DRESSING C/D/I.
CARDIZEM GTT INFUSING AT SHIFT CHANGED, PLACED ON STANDBY, NSR ON MONITOR.
WILL CONTINUE TO MONITOR.

## 2021-01-09 NOTE — NUR
PT IN AND OUT OF AFIB RVR. CALL PLACED TO [facility] AND ORDERS RECEIVED FOR
CARDIZEM 5MG IV PUSH FOLLOWED BY A DILT GTT. DILT GTT STARTED AT 5MG/HR AND IS
CURRENTLY INFUSING AT 10MG/HR. PT IS FLIPPING BACK AND FORTH FROM AFIB RVR TO
NSR. WILL CONTINUE TO MONITOR

## 2021-01-10 LAB
ANION GAP SERPL CALCULATED.4IONS-SCNC: 5 MMOL/L (ref 6–16)
BASOPHILS # BLD AUTO: 0.14 K/MM3 (ref 0–0.23)
BASOPHILS # BLD: 0 K/MM3 (ref 0–0.23)
BASOPHILS NFR BLD AUTO: 1 % (ref 0–2)
BASOPHILS NFR BLD: 0 % (ref 0–2)
BUN SERPL-MCNC: 27 MG/DL (ref 8–24)
CALCIUM SERPL-MCNC: 7.2 MG/DL (ref 8.5–10.1)
CHLORIDE SERPL-SCNC: 106 MMOL/L (ref 98–108)
CO2 SERPL-SCNC: 29 MMOL/L (ref 21–32)
CREAT SERPL-MCNC: 0.8 MG/DL (ref 0.6–1.2)
DEPRECATED RDW RBC AUTO: 61.9 FL (ref 35.1–46.3)
EOSINOPHIL # BLD AUTO: 0.59 K/MM3 (ref 0–0.68)
EOSINOPHIL # BLD: 0.36 K/MM3 (ref 0–0.68)
EOSINOPHIL NFR BLD AUTO: 3 % (ref 0–6)
EOSINOPHIL NFR BLD: 2 % (ref 0–6)
EOSINOPHIL NFR BRONCH MANUAL: 1 % (ref 0–1)
ERYTHROCYTE [DISTWIDTH] IN BLOOD BY AUTOMATED COUNT: 19.5 % (ref 11.7–14.2)
GLUCOSE SERPL-MCNC: 138 MG/DL (ref 70–99)
HCT VFR BLD AUTO: 27.3 % (ref 37–53)
HGB BLD-MCNC: 8.6 G/DL (ref 13.5–17.5)
IMM GRANULOCYTES # BLD AUTO: 2.35 K/MM3 (ref 0–0.1)
IMM GRANULOCYTES NFR BLD AUTO: 13 % (ref 0–1)
LYMPHOCYTES # BLD AUTO: 2.12 K/MM3 (ref 0.84–5.2)
LYMPHOCYTES # BLD: 0.91 K/MM3 (ref 0.84–5.2)
LYMPHOCYTES NFR BLD AUTO: 12 % (ref 21–46)
LYMPHOCYTES NFR BLD: 5 % (ref 21–46)
LYMPHOCYTES NFR BRONCH MANUAL: 40 % (ref 5–10)
MAGNESIUM SERPL-MCNC: 1.9 MG/DL (ref 1.6–2.4)
MCHC RBC AUTO-ENTMCNC: 31.5 G/DL (ref 31.5–36.5)
MCV RBC AUTO: 86 FL (ref 80–100)
METAMYELOCYTES # BLD MANUAL: 0.73 K/MM3 (ref 0–0)
METAMYELOCYTES NFR BLD MANUAL: 4 % (ref 0–0)
MONOCYTES # BLD AUTO: 1.66 K/MM3 (ref 0.16–1.47)
MONOCYTES # BLD: 0.18 K/MM3 (ref 0.16–1.47)
MONOCYTES NFR BLD AUTO: 9 % (ref 4–13)
MONOCYTES NFR BLD: 1 % (ref 4–13)
MONOS+MACROS NFR BRONCH MANUAL: 8 % (ref 90–100)
NEUTROPHILS # BLD AUTO: 11.39 K/MM3 (ref 1.96–9.15)
NEUTROPHILS NFR BLD AUTO: 62 % (ref 41–73)
NEUTROPHILS NFR BRONCH MANUAL: 52 % (ref 1–5)
NEUTS BAND NFR BLD MANUAL: 10 % (ref 0–8)
NEUTS SEG # BLD MANUAL: 16.06 K/MM3 (ref 1.96–9.15)
NEUTS SEG NFR BLD MANUAL: 78 % (ref 41–73)
NRBC # BLD AUTO: 0 K/MM3 (ref 0–0.02)
NRBC BLD AUTO-RTO: 0 /100 WBC (ref 0–0.2)
OTHER CELLS NFR BRONCH MANUAL: 1 % (ref 0–1)
PH BLDA: 7.47 [PH] (ref 7.35–7.45)
PHOSPHATE SERPL-MCNC: 2.6 MG/DL (ref 2.5–4.9)
PLATELET # BLD AUTO: 175 K/MM3 (ref 150–400)
POTASSIUM SERPL-SCNC: 3.2 MMOL/L (ref 3.5–5.5)
SODIUM SERPL-SCNC: 140 MMOL/L (ref 136–145)
TOTAL CELLS COUNTED BLD: 100
TROPONIN I SERPL-MCNC: <0.015 NG/ML (ref 0–0.04)

## 2021-01-10 NOTE — NUR
END OF SHIFT SUMMARY
PT CONT TO BE INTUBATED WITH VENT SETTINGS AC 14, , PEEP 12, FIO2 55%.
PT NOT FOLLOWING DIRECTIONS BUT HAS A GAG AND PERRLA. PROPOFOL INFUSING AT
50MCG/KG/MIN. PRECEDEX INFUSING AT 1.4MCG/KG/HR. PCA FENTANYL INFUSING AT
25MCG/HR. RHYTHM CONVERTED BACK TO SIN AT 0247 WITH RATE 60'S. JEFF INFUSING AT
40MCG/MIN. AMIODERONE INFUSING AT 1MG/MIN. SBP 'S; WILL TITRATE DOWN TO
25MCG/MIN. VHP INFUSING VIA OG AT GOAL RATE OF 15ML/HR WITH 30ML WATER FLUSHES
Q4HR. VALENTINE AND RECTAL TUBE IN PLACE AND DRAINING TO GRAVITY. WILL REPORT TO
AM RN WHEN AVAILABLE.

## 2021-01-10 NOTE — NUR
UPDATE
NOTIFIED DR PIERRE REGARDING TITRATION OF JEFF-SYNEPHRINE AND CARDIZEM. NEW
ORDERS PROVIDED TO START AN AMIODERONE BOLUS FOLLOWED BY A GTT, KEEP
JEFF-SYNEPHRINE INFUSING, AND TO STOP CARDIZEM WHEN AMIODERONE IS STARTED.

## 2021-01-10 NOTE — NUR
ASSUMED PT CARE
 
BEDSIDE REPORT FROM FAB MANCIA. ASSUMED PT CARE.
PT INTUBATED AND SEDATED. AC14/450/12/85 SATS >90%. PT HAD BRONCH EARLIER
TODAY, AWAITING SPECIMEN RESULTS. WILL CALL DR PIERRE. PT IN NSR PER MONITOR,
 ON NEOSYNEPHRINE @ 20MCG/MIN, PROPOFOL CURRENTLY INF @45MCG/KG/MIN,
PRECEDEX INF @ 1.4MCG/KG/HR, AMIO INF @ 0.5MG/HR, NS INF @ KVO, FENTANYL INF @
75MCG/HR. SKIN PALE AND INTACT. VALENTINE DRAINING DARK YELLOW URINE. RECTAL TUBE
TO GRAVITY. TUBE FEEDS INF @ 15ML/HR WITH H20 FLUSHES. ABD SOFT. SLIGHTLY
DISTENDED.
WILL CONTINUE TO MONITOR.

## 2021-01-10 NOTE — NUR
ASSUMED CARE AT 1900
PT LAYING IN BED INTUBATED WITH VENT SETTINGS AC 14, , PEEP 12, FIO2
100%. PT IS NOTED TO BE BREATH STACKING, HIGH PEAK PREASURES, AND O2 SAT
DECLINING TO LOW 80'S. DR PIERRE CALLED AND DISCUSSED WITH AIDA PEARSON AND THIS
RN, NO NEW ORDERS PROVIDED TO RN, JUST INSTRUCTION TO USE PRN ATIVAN AND
FENTANYL ADJUNCT SEDATION. 4MG ATIVAN AND 100MCG FENTANYL GIVEN AND HELPFUL
WITH BREATH STACKING. VENT SETTINGS CHANGED BY AIDA PEARSON TO PC 20, PEEP 15,
FIO2 100%. O2 SAT NOW 88-91% DR PIERRE UPDATED ABOUT VENT SETTINGS AND PT
CONDITION. NSR WITH HR 80-90. AMIODERONE INFUSING AT 0.5MG/MIN AND IS TO BE
DONE LATER THIS SHIFT; DR PIERRE AWARE. -130'S; JEFF INFUSING AT
20MCG/MIN. VHP INFUSING VIA OG AT 15ML/HR (GOAL) WITH 30ML WATER FLUSHES Q4HR.
VALENTINE AND RECTAL TUBE PATENT AND DRAINING TO GRAVITY. PT RESPONDS TTO NOXOUS
STIMULI AND OCCATIONALLY FOLLOWS DIRECTIONS; PROPOFOL INFUSING AT
50MCG/KG/MIN. PRECEDEX INFUSING AT 1.4MCG/KG/HR. PCA FENTANYL INFUSING AT
75MCG/HR. SEE SHIFT ASSESSMENT FOR FULL ASSESSMENT.

## 2021-01-10 NOTE — NUR
SHIFT SUMMARY
 
PT CONTINUES TO BE INTUBATED AND SEDATED. VENT SETTINGS NOW AC14/450/12/100,
STATS 87-92%, DR PIERRE AWARE. PT PALE, CLAMMY AND DIAPHORETIC. AFEBRILE. PT
HAS PICC LINE TO YUMIKO WITH PROPOFOL INF @ 50MCG/KG/MIN, AMIO INF @ 0.5MG/HR,
NEOSYNEPHRINE INF @ 20MCG/MIN, PRECEDEX INF @ 1.4MCG/KG/HR, NS @ KVO, FENTANYL
@ 75MCG/HR. SPOT CHECK CBG 140S. SBP >100, HR SR 70S. VALENTINE DRAINING DARK
YELLOW URINE. TUBE FEEDS TO NG TUBE ING @ 15ML/HR. PT HAS 20G TO LEFT AC, SITE
WNL. DRESSING NOT STICKING WELL DUE TO SKIN BEING MOIST. RECTAL TUBE DRAINING
TO GRAVITY.
WILL REPORT TO ONCOMING SHIFT.

## 2021-01-10 NOTE — NUR
SHIFT SUMMARY/REPORT TO JAYRO MANCIA
PT REMAINS INTUBATED AND SEDATED. FENTANYL INCREASED TO 75 MCG/HR, PROPOFOL 45
MCG/KG/MIN AND PRECEDEX 1.4 MCG/KG/HR. PHENYLEPHRINE GTT 20 MCG/MIN AND
AMIODORONE 0.5 MCG/MIN. PT REMAINED IN NSR THIS SHIFT. BRONCH COMPLETED THIS
SHIFT AND SAMPLES TO LAB. VALENTINE PATENT, DRAINING TO GRAVITY. RECTAL TUBE IN
PLACE. REPORT TO JAYRO MANCIA.

## 2021-01-10 NOTE — NUR
ASSUMED CARE
REPORT FROM RHETT MANCIA AT 0700. REPORT FROM RHETT RN. PT INTUBATED AND
SEDATED. VENT SETTINGS AC 14/450/12/85%. PROPOFOL GTT 50 MCG/KG/MIN, PRECEDEX
1.4 MCG/KG/HR, FENTANYL GTT 25 MCG/HR. PT WAKES c CARE. EASILY BECOMES
AGITATED AND COUGHS, FIGHTS VENTILATOR. MEDICATED c PRNS. LUNGS CLEAR.
PHENYLEPHRINE GTT FOR MAP>65. AMIODORONE GTT 0.5 MCG/MIN.
MODERATE SECRETIONS THROUGH ETT. ABD ROUND, SOFT, NON TENDER. BT X 4. VHP AT
GOAL OF 15 ML/HR c 30 ML FLUSHES q 4 HR. 120 ML RESIDUALS THIS AM. VALENTINE
PATENT, DRAINING CLEAR YELLOW URINE TO GRAVITY. RECTAL TUBE IN PLACE, DRAINING
BROWN LOOSE STOOL. WILL CONTINUE TO MONITOR.

## 2021-01-11 LAB
ANION GAP SERPL CALCULATED.4IONS-SCNC: 6 MMOL/L (ref 6–16)
BASOPHILS # BLD AUTO: 0.12 K/MM3 (ref 0–0.23)
BASOPHILS # BLD: 0.16 K/MM3 (ref 0–0.23)
BASOPHILS NFR BLD AUTO: 1 % (ref 0–2)
BASOPHILS NFR BLD: 1 % (ref 0–2)
BUN SERPL-MCNC: 22 MG/DL (ref 8–24)
CALCIUM SERPL-MCNC: 8.2 MG/DL (ref 8.5–10.1)
CHLORIDE SERPL-SCNC: 105 MMOL/L (ref 98–108)
CO2 SERPL-SCNC: 32 MMOL/L (ref 21–32)
CREAT SERPL-MCNC: 0.82 MG/DL (ref 0.6–1.2)
DEPRECATED RDW RBC AUTO: 58.6 FL (ref 35.1–46.3)
EOSINOPHIL # BLD AUTO: 0.02 K/MM3 (ref 0–0.68)
EOSINOPHIL # BLD: 0 K/MM3 (ref 0–0.68)
EOSINOPHIL NFR BLD AUTO: 0 % (ref 0–6)
EOSINOPHIL NFR BLD: 0 % (ref 0–6)
ERYTHROCYTE [DISTWIDTH] IN BLOOD BY AUTOMATED COUNT: 18.9 % (ref 11.7–14.2)
GLUCOSE SERPL-MCNC: 138 MG/DL (ref 70–99)
HCT VFR BLD AUTO: 28.8 % (ref 37–53)
HGB BLD-MCNC: 8.7 G/DL (ref 13.5–17.5)
IMM GRANULOCYTES # BLD AUTO: 1.27 K/MM3 (ref 0–0.1)
IMM GRANULOCYTES NFR BLD AUTO: 8 % (ref 0–1)
LYMPHOCYTES # BLD AUTO: 1.06 K/MM3 (ref 0.84–5.2)
LYMPHOCYTES # BLD: 1.12 K/MM3 (ref 0.84–5.2)
LYMPHOCYTES NFR BLD AUTO: 7 % (ref 21–46)
LYMPHOCYTES NFR BLD: 7 % (ref 21–46)
MCHC RBC AUTO-ENTMCNC: 30.2 G/DL (ref 31.5–36.5)
MCV RBC AUTO: 85 FL (ref 80–100)
METAMYELOCYTES # BLD MANUAL: 0.32 K/MM3 (ref 0–0)
METAMYELOCYTES NFR BLD MANUAL: 2 % (ref 0–0)
MONOCYTES # BLD AUTO: 1.44 K/MM3 (ref 0.16–1.47)
MONOCYTES # BLD: 0.96 K/MM3 (ref 0.16–1.47)
MONOCYTES NFR BLD AUTO: 9 % (ref 4–13)
MONOCYTES NFR BLD: 6 % (ref 4–13)
NEUTROPHILS # BLD AUTO: 12.16 K/MM3 (ref 1.96–9.15)
NEUTROPHILS NFR BLD AUTO: 76 % (ref 41–73)
NEUTS BAND NFR BLD MANUAL: 1 % (ref 0–8)
NEUTS SEG # BLD MANUAL: 13.33 K/MM3 (ref 1.96–9.15)
NEUTS SEG NFR BLD MANUAL: 82 % (ref 41–73)
NRBC # BLD AUTO: 0 K/MM3 (ref 0–0.02)
NRBC BLD AUTO-RTO: 0 /100 WBC (ref 0–0.2)
PH BLDA: 7.44 [PH] (ref 7.35–7.45)
PLATELET # BLD AUTO: 161 K/MM3 (ref 150–400)
POTASSIUM SERPL-SCNC: 4.3 MMOL/L (ref 3.5–5.5)
PROMYELOCYTES # BLD MANUAL: 0.16 K/MM3 (ref 0–0)
PROMYELOCYTES NFR BLD MANUAL: 1 % (ref 0–0)
SODIUM SERPL-SCNC: 143 MMOL/L (ref 136–145)
TOTAL CELLS COUNTED BLD: 100

## 2021-01-11 NOTE — NUR
END OF SHIFT SUMMARY
PT CONT TO BE INTUBATED WITH VENT SETTINGS AC 14, PC 20, PEEP 15, FIO2 100%.
PT FREQUENTLY BREATH STACKS BUT 02 SAT HAVE REMAINED ABOVE 90% WITH CURRENT
VENT SETTINGS. PRN ATIVAN AND FENTANYL HELP DECREASE BREATH STACKING. PT
OCCATIONALLY TRIES TO OPEN EYES WITH REPOSITIONING AND TALKING IN THE ROOM; PT
ALBE TO FOLLOW MINIMAL COMMANDS. PROPOFOL INFUSING AT 50MCG/KG/MIN. PRECEDEX
INFUSING AT 1.4MCG/KG/HR. FENTANYL INFUSING AT 75MCG/HR. NSR WITH HR 60-70 ALL
SHIFT; AMIODERONE STOPPED AT 0145. -140'S; JEFF INFUSING AT 20MCG/MIN.
VHP INFUSING AT 15ML/HR WITH 30ML WATER FLUSHES Q4HR. VALENTINE AND RECTAL TUBE
PATENT AND DRAINING TO GRAVITY; RECTAL TUBE BAG CHANGED THIS SHIFT. WILL
REPORT TO AM RN WHEN AVAILABLE.

## 2021-01-11 NOTE — NUR
PT REPOSITIONED IN PRONE, TOF 4/4 ON NIMBEX 2.5, PROPOFOL @50MCG/KG/HR,
PRECEDEX @1.4, LOTS OF ORAL SECRETIONS THAT LOOK COFFEE GROUND CONTINUE,
OG PUT TO INTERMITTENT SUCTION.  AWARE.

## 2021-01-11 NOTE — NUR
TONYA BEGAN THE SHIFT WITH SATURATIONS IN THE 80'S AND DECLINING. HIS LUNG
SOUNDS WERE DECREASED THROUGHOUT, NOT ABLE TO GET HIM TO COME UP WITH ANY
INTERVENTIONS. DR. ALFARO AWARE, PHONE CALL MADE TO FAMILY BY PALIATIVE CARE
TO UPDATE. THE FAMILY CAME IN TO TALK WITH DR. ALFARO AND MAKE DECISIONS.
DECISIONS WERE MADE TO PARALYZE PATIENT AND PRONE PATIENT. SINCE THE
PARALYTIC, SATS HAVE COME UP TO 90%, TOLERATING PRONE POSITION. HE IS CLAMMY
AND DIAPHORETIC FROM [place]OR[place] UP, HE HAS HAD COPIOUS AMOUNTS OF SECRETIONS ORALLY
AND THE OG WAS PUT TO INTERMITTENT SUCTION WITH COFFEE GROUND RETURN. ALL
ORAL/TUBE MEDS CURRENTLY HELD PER . VALENTINE CONTINUES TO DRAIN WITHOUT
ANY PROBLEMS. RECTAL TUBE WITH 200ML OUTPUT. MOM WITH PATIENT MOST OF
AFTERNOON.

## 2021-01-11 NOTE — NUR
1115-PARENTS IN TO SEE TONYA. DR. ALFARO IN TO SHARE INFORMATION ABOUT HIS
CONDITION AND ASK WHAT TONYA WOULD WANT. LENGTHY DISCUSSION, AGREE TO THE
PARALYZING AND PRONING. CONSIDERING ECMO. PASTORAL AND PALLIATIVE CARE IN TO
TALK AND COMFORT PARENTS. TONYA PLACED ON BIS MONITOR, BONY PROMINENCES
PADDED, TOF CONFIRMED. NIMBEX GTT STARTED, MEDICATED WITH FENTANYL AND ATIVAN,
TURNED PRONE, 3 RN'S AND R/T. PT TOLERATED WELL. SATS IMPROVING TO LOW 90'S,
HR STABLE, BP STABLE. NEOSYNEPHRINE AT 10, NIMBEX AT 2, PROPOFOL AT 50,
PRECEDEX AT 1.4, TKO X 2. SALINE LOCK IN LEFT WRIST. PT HAS BEEN MADE DNR BY
PARENTS. TITRATING AS NEEDED FOR PARALYSIS ON NIMBEX.

## 2021-01-11 NOTE — NUR
Spiritual care note:
 
I met with Pankaj's parents at bedside.  Dad is clearly overwhelmed and left
room.  Mom, Toña, held on to me and appeared comforted by my presence.
Provided anticipatory bereavement  and theraputic listening to good
effect.  Toña is struggling with "decision" going forward in the face of
medical futility.  She appears to understand outlook is dire.  Physician
checking for potential xfer to [facility] for ECMO.  I will remain available.

## 2021-01-11 NOTE — NUR
PT UNABLE TO MAINTAIN SATS >88% ON AIRVO, TOLD HIM IT WAS TIME TO GO BACK ON
BIPAP, HE COMPLIED. SATS TO 93%

## 2021-01-11 NOTE — NUR
PT RECEIVED FROM [facility] SHIFT, SLEEPING. AWAKENS TO VOICE, FOLLOWS COMMANDS, SATS
DROPPING IN THE 80'S, MEDICATED WITH FENTANYL AND ATIVAN FOR COMFORT. ATTEMPT
TO REPOSITION, NO CHANGE. CALL TO , ORDER TO GIVE ADDITIONAL ATIVAN.
R/T HERE, GIVING TREATEMENT AND CPT. NO CHANGES. Pt requesting update at this time. Christian BARTLETT at bedside to update.      Karuna Briseno LPN  17/03/14 [id number]

## 2021-01-11 NOTE — NUR
Pt resting in bed and is intubated.
 
Spoke with Dr Wise, Bedside BLANCHE Romero, discussed case and prognosis. Pt is
requiring significant vent support with 100% FIO2. Discussion with Pt's mother
may be beneficial regarding goals of care.
 
Called and spoke with Pt's mother Toña. Provided update and engaged in
therapeutic discussion of the possibility of needing to make a decision
regarding Pt's goals of care. Answered question and listened to concerns.
Toña reports Pt is a  and [facility]. Toña reports plan
to visit Pt during visitor hours.
 
Spoke with BLANCHE Romero who reports O2 saturations are decreasing. Family may
benefit from visiting sooner.
 
Called Pt's mother Toña and discussed further regarding Pt's condition. Toña
and Yannick (Pt's father) will arrive around 11:00-11:30.
 
Palliative Care will remain available.

## 2021-01-11 NOTE — NUR
Family has arrived to visit with Pt. Pt resting in bed and is intubated.
Bedside RNs providing medications and preparing to prone Pt. Pt's mother Toña
at bedside. Offered emotional support as she is intermittently tearful.
Engaged in therapeutic discussion regarding Pt's condition and the importance
of considering wishes for code status. Toña reports Pt would not want CPR in
his current condition. Continued therapeutic listening and discussed Pt's
advanced directive. Mother escorted to ICU waiting room in order for Pt to be
repositioned. jah Montero present and offering aditional support.
 
Spoke with Dr Wise. Place new code status of DNR per V/O from Dr Wise.
 
Palliative Care will remain available for supportive and therapeutic visits.

## 2021-01-11 NOTE — NUR
DR. ALFARO AT BEDSIDE-I TIME DECREASED TO 1 AND PEEP DECREASED TO 10-SPO2 95%.
MAP TRENDING LESS THAN 60-TITRATED NEOSYNEPHRINE UP TO 25MCG.

## 2021-01-11 NOTE — NUR
ASSUMED CARE NOTE:
 
ASSUMED CARE OF PT AT 1900, RECEVIED REPORT FROM JUNI MANCIA. PT IS
INTUBATED/SEDATED/PARALIZED. VENT SETTINGS AC/PC 20/5/80%  PT SEDATED WITH
50MCG/KG/MIN OF PROPOFOL, PRECEDEX 1.4MCG/KG/HR. FENTANYL AT 75MCG/HR. PT
PARALIZED WITH NIMBEX 2.5MCG/KG/MIN, TOF 4/4. PT CONTINUES TO BE PRONED, UNTIL
0000. RT CALLED TO BEDSIDE TO TURN HEAD. PT IN SINUS RHYTHM WITH HR IN THE
70'S. OGT TO LIS , COFFEE GROUND DRAINAGE NOTED. WILL HOLD TUBE FEED AND PT
MEDS UNTIL FURTHER INSTRUCTION. PROTONIX DRIP STARTED. FOELY DRAINING TO
GRAVITY, CLEAR, YELLOW. RECTAL TUBE DRAINING TO GRAVITY, BROWN LIQUID STOOL
NOTED. WILL CONTINUE TO MONITOR PT T/O SHIFT.

## 2021-01-11 NOTE — NUR
IN WITH PATIENT, SATS REMAIN LOW, DROPPING NOW IN TO THE 70'S. SUCTIONED,
LAVAGED, NO RETURN. REPOSITIONED SITTING UP STRAIGHTER, CALL TO DR. SALDIVAR, NO
NEW ORDERS. CALLED MOM WITH UPDATE, HAVE ASKED HER TO COME IN. PT MEDICATED.
NOT TURNED AT THIS TIME D/T SATS.

## 2021-01-11 NOTE — NUR
ASSISTED WITH PRONING OF PT @ APROX 1200. ONCE PT PLACED IN PRONE POSITION IT
WAS NOTED THAT THERE WAS EXTRA LARGE AMOUNT OF CLEAR SECRETIONS FOLLOWED BY A
MODERATE AMOUNT OF WHAT APPEARED TO BE LIQUACEL AS IT APPEARED PURPLE IN
COLOR. CHIP RAMOS AND DR. ALFARO AWARE. PT PARENTS AT BEDSIDE AT THIS TIME.

## 2021-01-12 LAB
ALBUMIN SERPL BCP-MCNC: 1.8 G/DL (ref 3.4–5)
ALBUMIN/GLOB SERPL: 0.3 {RATIO} (ref 0.8–1.8)
ALT SERPL W P-5'-P-CCNC: 17 U/L (ref 12–78)
ANION GAP SERPL CALCULATED.4IONS-SCNC: 1 MMOL/L (ref 6–16)
AST SERPL W P-5'-P-CCNC: 15 U/L (ref 12–37)
BASOPHILS # BLD AUTO: 0.12 K/MM3 (ref 0–0.23)
BASOPHILS # BLD: 0 K/MM3 (ref 0–0.23)
BASOPHILS NFR BLD AUTO: 1 % (ref 0–2)
BASOPHILS NFR BLD: 0 % (ref 0–2)
BILIRUB SERPL-MCNC: 0.4 MG/DL (ref 0.1–1)
BUN SERPL-MCNC: 22 MG/DL (ref 8–24)
CALCIUM SERPL-MCNC: 8.2 MG/DL (ref 8.5–10.1)
CHLORIDE SERPL-SCNC: 105 MMOL/L (ref 98–108)
CO2 SERPL-SCNC: 34 MMOL/L (ref 21–32)
CREAT SERPL-MCNC: 0.83 MG/DL (ref 0.6–1.2)
DEPRECATED RDW RBC AUTO: 62 FL (ref 35.1–46.3)
EOSINOPHIL # BLD AUTO: 0.02 K/MM3 (ref 0–0.68)
EOSINOPHIL # BLD: 0 K/MM3 (ref 0–0.68)
EOSINOPHIL NFR BLD AUTO: 0 % (ref 0–6)
EOSINOPHIL NFR BLD: 0 % (ref 0–6)
ERYTHROCYTE [DISTWIDTH] IN BLOOD BY AUTOMATED COUNT: 19 % (ref 11.7–14.2)
GLOBULIN SER CALC-MCNC: 5.3 G/DL (ref 2.2–4)
GLUCOSE SERPL-MCNC: 154 MG/DL (ref 70–99)
HCT VFR BLD AUTO: 29.7 % (ref 37–53)
HGB BLD-MCNC: 8.8 G/DL (ref 13.5–17.5)
IMM GRANULOCYTES # BLD AUTO: 1.25 K/MM3 (ref 0–0.1)
IMM GRANULOCYTES NFR BLD AUTO: 8 % (ref 0–1)
LYMPHOCYTES # BLD AUTO: 0.9 K/MM3 (ref 0.84–5.2)
LYMPHOCYTES # BLD: 0.94 K/MM3 (ref 0.84–5.2)
LYMPHOCYTES NFR BLD AUTO: 6 % (ref 21–46)
LYMPHOCYTES NFR BLD: 6 % (ref 21–46)
MAGNESIUM SERPL-MCNC: 2.4 MG/DL (ref 1.6–2.4)
MCHC RBC AUTO-ENTMCNC: 29.6 G/DL (ref 31.5–36.5)
MCV RBC AUTO: 88 FL (ref 80–100)
METAMYELOCYTES # BLD MANUAL: 0.63 K/MM3 (ref 0–0)
METAMYELOCYTES NFR BLD MANUAL: 4 % (ref 0–0)
MONOCYTES # BLD AUTO: 0.91 K/MM3 (ref 0.16–1.47)
MONOCYTES # BLD: 0.15 K/MM3 (ref 0.16–1.47)
MONOCYTES NFR BLD AUTO: 6 % (ref 4–13)
MONOCYTES NFR BLD: 1 % (ref 4–13)
NEUTROPHILS # BLD AUTO: 12.56 K/MM3 (ref 1.96–9.15)
NEUTROPHILS NFR BLD AUTO: 80 % (ref 41–73)
NEUTS BAND NFR BLD MANUAL: 2 % (ref 0–8)
NEUTS SEG # BLD MANUAL: 14.02 K/MM3 (ref 1.96–9.15)
NEUTS SEG NFR BLD MANUAL: 87 % (ref 41–73)
NRBC # BLD AUTO: 0.02 K/MM3 (ref 0–0.02)
NRBC BLD AUTO-RTO: 0.1 /100 WBC (ref 0–0.2)
PH BLDA: 7.29 [PH] (ref 7.35–7.45)
PH BLDA: 7.29 [PH] (ref 7.35–7.45)
PHOSPHATE SERPL-MCNC: 3.5 MG/DL (ref 2.5–4.9)
PLATELET # BLD AUTO: 157 K/MM3 (ref 150–400)
POTASSIUM SERPL-SCNC: 5.8 MMOL/L (ref 3.5–5.5)
PROT SERPL-MCNC: 7.1 G/DL (ref 6.4–8.2)
SODIUM SERPL-SCNC: 140 MMOL/L (ref 136–145)
TOTAL CELLS COUNTED BLD: 100

## 2021-01-12 PROCEDURE — 03HB33Z INSERTION OF INFUSION DEVICE INTO RIGHT RADIAL ARTERY, PERCUTANEOUS APPROACH: ICD-10-PCS | Performed by: INTERNAL MEDICINE

## 2021-01-12 NOTE — NUR
CALLED DOMINIC REGARING LOW MINUTE VENTILATION AND LOW TV. RT INCREASED
INSPRIATORY PRESSURE TO 25, PEAK PRESSURE INCREASED TO 34. PHYSICAN IS AWARE
OF CURRENT STATUS.

## 2021-01-12 NOTE — NUR
RHYTHM CHANGE
PT NOTED TO BE AFIB WITH RVR; 'S-140'S. EKG OBTAINED. CALLED DR. ALFARO
WITH ORDERS FOR AMIODARONE 150MG BOLUS FOLLOWED BY 1MG/HR X18 HOURS.

## 2021-01-12 NOTE — NUR
Pt resting in bed upon arrival. Pt just repositioned to prone prior to visit.
Pt remains intubated at this time.
 
Discussed case with Bedside RN Thelma and Charge RN Jayden.
 
Called and spoke with Pt's mother Toña. Provided update and therapeutic
listening. Plan for Toña and Toña's daughter to visit today.
 
Palliative Care will remain available supportive and therapeutic visits.

## 2021-01-12 NOTE — NUR
SUMMARY
PT INTUBATED, SEDATED, AND PARALYZED FOR VENT COMPLIANCE. BIS MONITOR READING
30-40. USING ATIVAN AND FENTANYL PUSHES IN CONJUNCTION FOR SEDATION. THIS AM
PT WAS PROFUSELY SWEATING AND AFTER MORE SEDATION WAS GIVEN THAT STOPPED. DR. ALFARO DID NOT WANT SEDATION OR NIMBEX VACATION TODAY DUE TO INCREASED FIO2
DEMANDS AND PT IS DIFFICULT TO SEDATE. A-LINE WAS PLACED TODAY. NEOSYNEPHERINE
WAS STOPPED THIS AM. NOW PT IS HYPERTENSIVE. GIVING LABETOLOL AND HYDRALAZINE
PRN. ALSO GIVING SEDATION TO KEEP BIS AROUND 30. TOF HAS BEEN 3/4 MOST OF THE
DAY. DR. ALFARO OK'D TUBE FEEDING TO RESTART AND XARELTO TO BE GIVEN. NO
COFFEE GROUND EMESIS TODAY. TO CONTINUE PROTONIX GTT. MOM AND SISTER CAME IN
TODAY TO SEE PT AND WERE UPDATED.

## 2021-01-12 NOTE — NUR
PT INTUBATED, SEDATED, AND PARALYZED WITH NIMBEX. BIS MONITOR IS READING 30
AND TOF 3/4. PT WAS ON PROPOFOL, FENTANYL GTT, AND PRECEDEX THIS AM. PRECEDEX
WAS TURNED OFF PER DR. ALFARO. DR. ALFARO DID NOT WANT A VACATION FROM
SEDATION OR NIMBEX. USING ATIVAN IN CONJUNCTION FOR SEDATION. WILL BE PRONING
PT TODAY. DR. ALFARO PLACED A-LINE TO L WRIST TO MONITOR ABG. GOOD WAVEFORM.
NEOSYNEPHERINE WAS STOPPED AS WELL. OG TUBE IS PUTTING OUT GREEN/BROWN LIQUID.
DR. ALFARO WANTS TO KEEP PT ON PROTONIX GTT FOR NOW. MAY RESTART TUBE FEEDS
TODAY.

## 2021-01-12 NOTE — NUR
UPDATE:
 
PT UNPRONED 5 STAFF MEMBERS, INCLUDING RT TO MANAGE VENT. PT PLACED %
FiO2 BEFORE PLACING PT ON SUPINE. NO COMPLICATIONS NOTED. PT SPO2 AFTER
PRONING WAS 93% ORAL CARE PROVIDED AND BEDBATH COMPLETE.

## 2021-01-12 NOTE — NUR
SHIFT SUMMARY :
 
 
SEE PREVIOUS NOTES.
PT CONTINUES TO BE PARALYZED WITH NIMBEX AT 3MCG/KG/MIN, TOF 4/4. PT VENT
SETTINGS AC/PC 20, FiO2 100%, PEEP 10. TIDAL VOLUMES INCREASED FROM 270 
WITH INSPIATORY PRESSURE CHANGED TO 25. PT CONTINUES TO BE SEDATED WITH
PROPOFOL AT 50MCG/KG/MIN, FENTANYL AT 75MCG/HR, PRECEDEX AT 1.4MCG/KG/HR. PT
IN SR WITH HR IN THE 70-80'S. BP STABLE WITH JEFF AT 25MCG/MIN. OGT TO LIS,
700ML OF COFFEE GROUND DRAINAGE NOTED. VALENTINE PATENT AND DRAINING TO GRAVITY.
RECTAL TUBE TO GRAVITY, VERY MINIMAL OUTPUT, POSSIBLE DC THIS AM. PT HAS BEEN
VERY DIAPHORETIC THIS SHIFT, UNABLE TO CONNECT BIS MONITOR, POOR READINGS.
WILL CONTINUE TO MONTIOR PT UNTIL REPORT IS GIVEN TO ONCOMING SHIFT.

## 2021-01-12 NOTE — NUR
ASSUMED PT CARE FROM BLANCHE FAJARDO AT 1900
PT INTUBATED, SEDATED, AND PARALYZED. PROPOFOL AT 60MCG/KG/MIN, FENTANYL AT
75MCG/HR, AND NIMBEX AT 3MCG/KG/MIN INFUSING VIA PICC TO RIGHT UPPER ARM. VENT
SETTINGS AC 20, , PEEP 10, FIO2 80%. PT IS SYNCHRONISE WITH THE VENT.
PROTONIX INFUSING VIA LEFT HAND IV THAT WAS NOT PATENT UPON ASSESSMENT;
THEREFORE PROTONIX IS ON STANDBY AND IV PULLED. ARTERIAL LINE TO RIGHT RADIAL
SITE; ZERO'D WITH SBP'S >180. BIS MONITOR READING 45. TRAIN OF FOUR 4/4.
MEDICATED BP WITH HYDRALAZINE 10MG AND ADMINISTERED 2MG OF VERSED WITH NO
EFFECT ON BP.  THEREFORE, CALLED DR. ALFARO IN REGARDS . NEW ORDERS TO
RESTART PRECEDEX WITH ALLOWED MAX DOSE AT 1.4MCG/KG/HR. ALSO INFORMED DR. ALFARO REGARDING PROTONIX ON STANDBY D/T LOSING IV ACCESS. INFORMED HIM THAT
PT IS STILL IN THE PRONE POSITION; THEREFORE, MAKING IT VERY DIFFICULT TO
OBTAIN IV ACCESS. ORDERS TO HOLD OFF ON THE PROTONIX UNTIL PT IS SUPINE AGAIN.
ONCE IV ACCESS IS OBTAINED, RESTART THE PROTONIX; HOWEVER, ONCE BAG IS
COMPLETE, DICONTINUE AND HE WILL ADDRESS IN THE MORNING.
TUBE FEED REMAINS ON HOLD D/T PT BEING IN THE PRONE POSITION; OG HOOKED TO LOW
INTERMITTENT SUCTION WITH GREEN/BROWN OUTPUT NOTED. VALENTINE CATHETER IS PATENT
AND DRAINING CLEAR, YELLOW URINE TO GRAVITY. WILL CONTINUE TO MONITOR.

## 2021-01-12 NOTE — NUR
UPDATE:
 
PT REPOSITIONED TO RIGHT SIDE, PT BECAME TO HAVE LOW , CALLED RT TO
BEDSIDE. BREATHING TREATMENT INITIATED, PT COARSE T/O.

## 2021-01-13 LAB
ANION GAP SERPL CALCULATED.4IONS-SCNC: 3 MMOL/L (ref 6–16)
BASOPHILS # BLD AUTO: 0.15 K/MM3 (ref 0–0.23)
BASOPHILS # BLD: 0.19 K/MM3 (ref 0–0.23)
BASOPHILS NFR BLD AUTO: 1 % (ref 0–2)
BASOPHILS NFR BLD: 1 % (ref 0–2)
BUN SERPL-MCNC: 30 MG/DL (ref 8–24)
CALCIUM SERPL-MCNC: 8.5 MG/DL (ref 8.5–10.1)
CHLORIDE SERPL-SCNC: 102 MMOL/L (ref 98–108)
CO2 SERPL-SCNC: 33 MMOL/L (ref 21–32)
CREAT SERPL-MCNC: 0.79 MG/DL (ref 0.6–1.2)
DEPRECATED RDW RBC AUTO: 62.4 FL (ref 35.1–46.3)
EOSINOPHIL # BLD AUTO: 0.01 K/MM3 (ref 0–0.68)
EOSINOPHIL # BLD: 0 K/MM3 (ref 0–0.68)
EOSINOPHIL NFR BLD AUTO: 0 % (ref 0–6)
EOSINOPHIL NFR BLD: 0 % (ref 0–6)
ERYTHROCYTE [DISTWIDTH] IN BLOOD BY AUTOMATED COUNT: 19.2 % (ref 11.7–14.2)
GLUCOSE SERPL-MCNC: 162 MG/DL (ref 70–99)
HCT VFR BLD AUTO: 30.5 % (ref 37–53)
HGB BLD-MCNC: 9 G/DL (ref 13.5–17.5)
IMM GRANULOCYTES # BLD AUTO: 1.55 K/MM3 (ref 0–0.1)
IMM GRANULOCYTES NFR BLD AUTO: 8 % (ref 0–1)
LYMPHOCYTES # BLD AUTO: 1.22 K/MM3 (ref 0.84–5.2)
LYMPHOCYTES # BLD: 0.78 K/MM3 (ref 0.84–5.2)
LYMPHOCYTES NFR BLD AUTO: 6 % (ref 21–46)
LYMPHOCYTES NFR BLD: 4 % (ref 21–46)
MAGNESIUM SERPL-MCNC: 2.5 MG/DL (ref 1.6–2.4)
MCHC RBC AUTO-ENTMCNC: 29.5 G/DL (ref 31.5–36.5)
MCV RBC AUTO: 89 FL (ref 80–100)
METAMYELOCYTES # BLD MANUAL: 0.39 K/MM3 (ref 0–0)
METAMYELOCYTES NFR BLD MANUAL: 2 % (ref 0–0)
MONOCYTES # BLD AUTO: 1.37 K/MM3 (ref 0.16–1.47)
MONOCYTES # BLD: 0.58 K/MM3 (ref 0.16–1.47)
MONOCYTES NFR BLD AUTO: 7 % (ref 4–13)
MONOCYTES NFR BLD: 3 % (ref 4–13)
NEUTROPHILS # BLD AUTO: 15.35 K/MM3 (ref 1.96–9.15)
NEUTROPHILS NFR BLD AUTO: 78 % (ref 41–73)
NEUTS BAND NFR BLD MANUAL: 3 % (ref 0–8)
NEUTS SEG # BLD MANUAL: 17.68 K/MM3 (ref 1.96–9.15)
NEUTS SEG NFR BLD MANUAL: 87 % (ref 41–73)
NRBC # BLD AUTO: 0 K/MM3 (ref 0–0.02)
NRBC BLD AUTO-RTO: 0 /100 WBC (ref 0–0.2)
PHOSPHATE SERPL-MCNC: 4.4 MG/DL (ref 2.5–4.9)
PLATELET # BLD AUTO: 232 K/MM3 (ref 150–400)
POTASSIUM SERPL-SCNC: 5.5 MMOL/L (ref 3.5–5.5)
SODIUM SERPL-SCNC: 138 MMOL/L (ref 136–145)
TOTAL CELLS COUNTED BLD: 100

## 2021-01-13 NOTE — NUR
PARALYTIC VACATION
NIMBEX PLACED ON STANDBY FOR APPROX 1 HOUR. PT BEGAN DOUBLE STACKING ON VENT.
O2 SATS DECREASED TO 84-86%. DR ALFARO NOTIFIED. PT UNRESPONSIVE TO PAINFUL
STIMULI. RESTARTED NIMBEX AS ORDERED.

## 2021-01-13 NOTE — NUR
Supportive visit this afternoon
 
Spoke with Bedside RN Edilma and discussed case. No changes in Pt's
condition. Plan for proning soon.
 
Pt's mother and daughter at bedside. Offered supoortive visit and therapeutic
listening. Mother Toña reports Pt's father is not emotionaly handling
situation well. Daughter requests a list of counselors. Instructed this RN
will investigate.
 
Will attempt to locate resources.

## 2021-01-13 NOTE — NUR
END OF SHIFT SUMMARY
NO SIGNIFICANT CHANGES SINCE LAST ENTRY. PROPOFOL REMAINS AT 60MCG/KG/MIN,
PRECEDEX AT 1.4MCG/KG/HR, FENTANYL AT 75MCG/HR; BIS READING 45-55. AMIODARONE
REMAINS AT 1MG/MIN WITH PT BACK IN NSR; HR 50-60'S. BP'S REMAIN STABLE AT THIS
TIME; HOWEVER, NEOSYNEPHRINE IS AT 50MCG/MIN WITH SBP'S 100-110; MAP'S >65.
ARTERIAL LINE REMAINS CDI TO RIGHT RADIAL ARTERY; TRANSDUCER AT PHLEBOSTATIC
AXIS AND HAS BEEN ZERO'D. PROTONIX GTT HAS COMPLETED ITS INFUSION AND REMAINS
OFF PER DR. KEARNS T/O ORDERS. VENT SETTINGS AC 20, , PEEP 10, FIO2
80%; BIOX >90%. RECTAL TUBE PULLED THIS SHIFT D/T NO OUTPUT X24 HOURS;
ADMINISTERED SENOKOT PER ORDERS. VALENTINE CATHETER REMAINS PATENT AND DRAINING
LARGE AMOUNTS OF CLEAR YELLOW URINE TO GRAVITY. TUBE FEEDING RESTARTED AFTER
PT TURNED BACK INTO THE SUPINE POSITION; VITAL HIGH PROTEIN AT GOAL OF
15MLS/HR WITH NO RESIDUALS NOTED. WILL CONTINUE TO MONITOR UNTIL REPORT IS
HANDED OFF TO ONCOMING RN.

## 2021-01-13 NOTE — NUR
ASSUMED PT CARE AT 1900 FROM [facility]
PT REMAINS INTUBATED, SEDATED, AND PARALYZED. VENT SETTINGS UNCHANGED AT AC
20, , PEEP 10, FIO2 80%; BIOX >90%. PROPOFOL AT 60MCG/KG/MIN, PRECEDEX
AT 1.4 MCG/KG/HR, FENTANYL AT 75MCG/HR, AND NIMBEX AT 3MCG/KG/MIN.
NEOSYNEPHRINE ON STANDBY. PT IS NSR; HR 60'S. SBP 'S; MAP'S >65. VITAL
HIGH PROTEIN AT 15MLS/HR; GOAL IS 50MLS/HR. PT REMAINS PRONED; PLAN TO UNPRONE
AT 0200. VALENTINE CATHETER REMAINS PATENT AND DRAINING TO GRAVITY. PICC LINE
TO RIGHT UPPER EXTREMITY; 20G TO LEFT AC AND 20G TO LEFT FOREARM. ARTERIAL
LINE TO RIGHT RADIAL ARTERY; TRANSDUCER AT PHLEBOSTATIC AXIS AND HAS BEEN
ZERO'D. no

## 2021-01-13 NOTE — NUR
DR ALFARO ROUND
AMIODORONE AND NIMBEX PLACED ON STANDBY. PT NSR, RATE 50'S. PHENYLEPHRINE 40
MCG/MIN. PLAN TO ATTEMPT TO TITRATE PRECEDEX DOWN THIS SHIFT. BIS 40-50'S.
WILL CONTINUED PRONING AT 1200 UNTIL 0000.

## 2021-01-13 NOTE — NUR
Patient remains intubated, on precedex 1.4 mcg/kg, propofol 60 mcg/kg,
fentanyl 75mcg and nimbex 3mcg/kg. Train of 4 still 4/4. BP's remain stable,
Urine output good, no BM today, skin remains intact.
Tube feeding turned off for 1 hour d/t high residual. Restarted at 15ml/hr at
13:00 am, to be titrated up 10 ml every 8 hours for a goal of 50ml per
nutritionist order. Patient put in prone position at 1400 and will remain for
12 hours per MD order. MD spoke to Mother and sister of patient and no changes
to care at this time.

## 2021-01-14 LAB
ANION GAP SERPL CALCULATED.4IONS-SCNC: 1 MMOL/L (ref 6–16)
BASOPHILS # BLD AUTO: 0.07 K/MM3 (ref 0–0.23)
BASOPHILS # BLD: 0.13 K/MM3 (ref 0–0.23)
BASOPHILS NFR BLD AUTO: 1 % (ref 0–2)
BASOPHILS NFR BLD: 1 % (ref 0–2)
BUN SERPL-MCNC: 29 MG/DL (ref 8–24)
CALCIUM SERPL-MCNC: 8.5 MG/DL (ref 8.5–10.1)
CHLORIDE SERPL-SCNC: 102 MMOL/L (ref 98–108)
CO2 SERPL-SCNC: 39 MMOL/L (ref 21–32)
CREAT SERPL-MCNC: 0.69 MG/DL (ref 0.6–1.2)
DEPRECATED RDW RBC AUTO: 60.6 FL (ref 35.1–46.3)
EOSINOPHIL # BLD AUTO: 0.01 K/MM3 (ref 0–0.68)
EOSINOPHIL # BLD: 0 K/MM3 (ref 0–0.68)
EOSINOPHIL NFR BLD AUTO: 0 % (ref 0–6)
EOSINOPHIL NFR BLD: 0 % (ref 0–6)
ERYTHROCYTE [DISTWIDTH] IN BLOOD BY AUTOMATED COUNT: 18.5 % (ref 11.7–14.2)
GLUCOSE SERPL-MCNC: 156 MG/DL (ref 70–99)
HCT VFR BLD AUTO: 28.2 % (ref 37–53)
HGB BLD-MCNC: 8.3 G/DL (ref 13.5–17.5)
IMM GRANULOCYTES # BLD AUTO: 0.67 K/MM3 (ref 0–0.1)
IMM GRANULOCYTES NFR BLD AUTO: 5 % (ref 0–1)
LYMPHOCYTES # BLD AUTO: 1.2 K/MM3 (ref 0.84–5.2)
LYMPHOCYTES # BLD: 0.68 K/MM3 (ref 0.84–5.2)
LYMPHOCYTES NFR BLD AUTO: 9 % (ref 21–46)
LYMPHOCYTES NFR BLD: 5 % (ref 21–46)
MAGNESIUM SERPL-MCNC: 2.1 MG/DL (ref 1.6–2.4)
MCHC RBC AUTO-ENTMCNC: 29.4 G/DL (ref 31.5–36.5)
MCV RBC AUTO: 89 FL (ref 80–100)
METAMYELOCYTES # BLD MANUAL: 0.27 K/MM3 (ref 0–0)
METAMYELOCYTES NFR BLD MANUAL: 2 % (ref 0–0)
MONOCYTES # BLD AUTO: 0.87 K/MM3 (ref 0.16–1.47)
MONOCYTES # BLD: 0.68 K/MM3 (ref 0.16–1.47)
MONOCYTES NFR BLD AUTO: 6 % (ref 4–13)
MONOCYTES NFR BLD: 5 % (ref 4–13)
NEUTROPHILS # BLD AUTO: 10.89 K/MM3 (ref 1.96–9.15)
NEUTROPHILS NFR BLD AUTO: 79 % (ref 41–73)
NEUTS BAND NFR BLD MANUAL: 3 % (ref 0–8)
NEUTS SEG # BLD MANUAL: 11.92 K/MM3 (ref 1.96–9.15)
NEUTS SEG NFR BLD MANUAL: 84 % (ref 41–73)
NRBC # BLD AUTO: 0 K/MM3 (ref 0–0.02)
NRBC BLD AUTO-RTO: 0 /100 WBC (ref 0–0.2)
PH BLDA: 7.28 [PH] (ref 7.35–7.45)
PHOSPHATE SERPL-MCNC: 4 MG/DL (ref 2.5–4.9)
PLATELET # BLD AUTO: 204 K/MM3 (ref 150–400)
POTASSIUM SERPL-SCNC: 4.5 MMOL/L (ref 3.5–5.5)
SODIUM SERPL-SCNC: 142 MMOL/L (ref 136–145)
TOTAL CELLS COUNTED BLD: 100

## 2021-01-14 NOTE — NUR
VS labile all shift. Patient in afib at beginning of shift with soft BP's, HR
125-160's. 2 doses of 2.5mg lopressor given with no results. Cardizem was to
be started at 10 am, but patient converted to NSR @ 0944 am. Went back into
Afib @ 1345. 25mg push cardizem given, HR dropped briefly in the 80's, but
went right back into
the 120's. Cardizem drip started @ 1500, titrated up to max of 20, with no
results as of yet. HR continues in the 120's - 150's. BP's are more stable ,
ranging in the low 90's to 120's sys. Urine output good and had 2 loose BM
today. Rectal tube placed at 1330 when repositioned in prone position. ABG
done after some adjustments to vent, and pH 7.28 CO2 82.2 resulted. RT
increased RR to compensate.
 
 
 
compensate.

## 2021-01-14 NOTE — NUR
END OF SHIFT SUMMARY
VENT SETTINGS REMAIN UNCHANGED; HOWEVER, FIO2 DOWN TO 75% WITH BIOX >90%.
PROPOFOL REMAINS AT 60MCG/KG/MIN, PRECEDEX 0.5MCG/KG/HR, FENTANYL AT 75MCG/HR.
NEOSYNEPHRINE TURNED OFF THIS MORNING; PT'S SBP'S 'S. PT REMAINED IN NSR
T/O SHIFT WITH HR 50-70'S. NIMBEX REMAINS ON AT 3MCG/KG/MIN; TRAIN OF FOUR
4/4; BIS 45-55. SYNCHRONISE WITH THE VENT. ADDITIONAL ATIVAN PUSHES AS NEEDED
FOR BIS >55. PT UNPRONED AT 0200; TOLERATED WELL. LUNG SOUNDS APPEAR MORE
CLEAR. SCANT AMOUNTS OF THIN, CLEAR SPUTUM OUT. VITAL HIGH PROTEIN INFUSING AT
35MLS/HR; GOAL IS 50MLS/HR. VALENTINE CATHETER IN PLACE AND DRAINING TO GRAVITY;
CLEAR, YELLOW. PICC LINE TO RIGHT UPPER ARM; DRESSING IS CDI. WILL CONTINUE TO
MONITOR UNTIL REPORT IS HANDED OFF TO ONCOMING RN.

## 2021-01-14 NOTE — NUR
ASSUMED PT CARE FROM [facility] AT 1915
PT INTUBATED/SEDATED/PARALYZED. VENT AC 28, , PEEP 10, FIO2 80%; BIOX
>90%. PROPOFOL 60MCG/KG/MIN, PRECEDEX AT 0.5MCG/KG/HR, DECREASED TO
0.3MCG/KG/HR, NIMBEX AT 3 MCG/KG/MIN WITH TRAIN OF FOUR 4/4. BIS MONITOR
READING 40-50'S. CARIZEM GTT INFUSING AT 20MG/HR D/T AFIB WITH RVR; HR
130-150'S. SPOKE WITH DR. ALFARO ABOUT BOLUSING FLUIDS D/T HR AND PT BEING
NEGATIVE FLUID BALANCE AT LEAST 1L/DAY. NO ORDERS TO GIVE FLUIDS D/T ARDS;
HOWEVER, OKAY TO DISCONTINUE LASIX. CALLED AGAIN AT 2130 WITH ORDERS TO STOP
CARDIZEM D/T NO EFFECT AND GIVE LOPRESSOR 25MG PT Q6HRS AND TO HOLD FOR A HR
<80. PT CONVERTED BACK TO NSR WITH HR 80-90'S AT 2220. BP REMAINS STABLE, SEE
FLOWSHEET. ARTERIAL LINE REMAINS IN RIGHT RADIAL ARTERY; HOWEVER, SQUARE WAVE
FORM TEST WAS SLOW TO RESPOND WITH NO OSCILLATIONS NOTED. TRANSDUCER REMAINS
AT PHLEBOSTATIC AXIS AND WAS ZERO'D AT BEGINNING OF SHIFT. VITAL HIGH PROTEIN
INCREASED TO GOAL OF 50MLS/HR AT 2200 WITH NO RESIDUALS NOTED THIS SHIFT.
VALENTINE CATHETER REMAINS PATENT AND DRAINING TO GRAVITY. PLAN IS TO UNPRONE AT
0000. WILL CONTINUE TO MONITOR.

## 2021-01-14 NOTE — NUR
Pt remains intubated. Discussed case with Bedside RN Edilma. No change in
Pt's condition. Family been visiting with Pt daily. Will attempt to visit with
family today during visitor hours.
 
Palliative Care will remain available.

## 2021-01-14 NOTE — NUR
VSS entire shift, "seizure" like activity continues with 4mg ativan only
relief (Q2). Febrile entire shift despite tylenol and ice packs. Lowest temp
acheived was 100.2. Family came in again today and informed them that we can
only have one family member here a day d/t the pandemic risk. Mother stated
that she understands. Girlfriend stayed for visiting hours today. ET tube
advanced 4cm this am with severe gag reflex and arm posturing. Did not have
purposful movement, but did react to advance of tube. Urine output continues
to be stable, rectal tube in place with no leaking.

## 2021-01-14 NOTE — NUR
Assumed care of patient at 0700. Patient in afib, called Dr Centeno for
orders. Lopressor ordered 2.5 and another if BP tolerates in 5 min. Both doses
given, with BP tolerating. Heart rate reduced from 160's to 130's but still in
afib. MD considering cardizem. Patient converted to NS at 0944 with no other
interventions.

## 2021-01-15 LAB
ANION GAP SERPL CALCULATED.4IONS-SCNC: -1 MMOL/L (ref 6–16)
BASOPHILS # BLD: 0 K/MM3 (ref 0–0.23)
BASOPHILS NFR BLD: 0 % (ref 0–2)
BUN SERPL-MCNC: 31 MG/DL (ref 8–24)
CALCIUM SERPL-MCNC: 8.6 MG/DL (ref 8.5–10.1)
CHLORIDE SERPL-SCNC: 101 MMOL/L (ref 98–108)
CO2 SERPL-SCNC: 41 MMOL/L (ref 21–32)
CREAT SERPL-MCNC: 0.68 MG/DL (ref 0.6–1.2)
DEPRECATED RDW RBC AUTO: 58.6 FL (ref 35.1–46.3)
EOSINOPHIL # BLD: 0 K/MM3 (ref 0–0.68)
EOSINOPHIL NFR BLD: 0 % (ref 0–6)
ERYTHROCYTE [DISTWIDTH] IN BLOOD BY AUTOMATED COUNT: 18.2 % (ref 11.7–14.2)
GLUCOSE SERPL-MCNC: 209 MG/DL (ref 70–99)
HCT VFR BLD AUTO: 28.4 % (ref 37–53)
HGB BLD-MCNC: 8.3 G/DL (ref 13.5–17.5)
LYMPHOCYTES # BLD: 0.75 K/MM3 (ref 0.84–5.2)
LYMPHOCYTES NFR BLD: 6 % (ref 21–46)
MAGNESIUM SERPL-MCNC: 1.9 MG/DL (ref 1.6–2.4)
MCHC RBC AUTO-ENTMCNC: 29.2 G/DL (ref 31.5–36.5)
MCV RBC AUTO: 89 FL (ref 80–100)
MONOCYTES # BLD: 0.75 K/MM3 (ref 0.16–1.47)
MONOCYTES NFR BLD: 6 % (ref 4–13)
MYELOCYTES # BLD MANUAL: 0.25 K/MM3 (ref 0–0)
MYELOCYTES NFR BLD MANUAL: 2 % (ref 0–0)
NEUTS BAND NFR BLD MANUAL: 5 % (ref 0–8)
NEUTS SEG # BLD MANUAL: 10.75 K/MM3 (ref 1.96–9.15)
NEUTS SEG NFR BLD MANUAL: 81 % (ref 41–73)
NRBC # BLD AUTO: 0.02 K/MM3 (ref 0–0.02)
NRBC BLD AUTO-RTO: 0.2 /100 WBC (ref 0–0.2)
PH BLDV: 7.36 [PH] (ref 7.34–7.37)
PHOSPHATE SERPL-MCNC: 3.3 MG/DL (ref 2.5–4.9)
PLATELET # BLD AUTO: 186 K/MM3 (ref 150–400)
POTASSIUM SERPL-SCNC: 4.3 MMOL/L (ref 3.5–5.5)
SODIUM SERPL-SCNC: 141 MMOL/L (ref 136–145)
TOTAL CELLS COUNTED BLD: 100

## 2021-01-15 NOTE — NUR
TOOK PATIENT TO CT WITH RT AND 2 RN'S, BACK FROM CT AT 1335 WITH NO ISSUES.
PATIENT PUT IN PRONE POSITION UPON RETURN

## 2021-01-15 NOTE — NUR
END OF SHIFT SUMMARY
NO SIGNIFICANT CHANGES. VENT SETTINGS REMAIN UNCHANGED; HOWEVER, NIMBEX
INCREASED TO 4MCG/KG/MIN D/T RESP RATE 30'S. TRAIN OF FOUR: 4/4. PROPOFOL AT
65MCG/KG/MIN, PRECEDEX TURNED OFF EARLY THIS MORNING, FENTANYL CONTINUES AT
75MCG/HR; BIS MONITOR 45-55 WITH ATIVAN PUSHES AS NEEDED TO KEEP BIS <60.
ARTERIAL LINE CONTINUES TO HAVE A DICROTIC NOTCH; HOWEVER, UNSURE HOW RELIABLE
READINGS ARE AS AFTER ZEROING THE ARTERIAL LINE, THE SQUARE WAVE FORM
TEST IS VERY SLURRED AND SLOW TO RESPOND WITH NO OSCILLATIONS FOLLOWING THE
WAVE. PT ALSO STARTED ALARMING SBP'S 60'S AND MAP'S <50. ATTEMPTED TO CONNECT
NONINVASIVE BP CUFF TO LEFT UPPER ARM;  HOWEVER, TECHNICAL PROBLEMS WERE BEING
TROUBLE SHOOTED WITH THE MONITOR NOT RECOGNIZING THE NBP; THEREFORE, IN THE
MEANTIME STARTED NEOSYNEPHRINE BACK UP IN CASE TRUE READINGS WERE BEING
OBTAINED FROM ARTERIAL LINE. HOWEVER, ONCE NONINVASIVE CUFF ABLE TO READ IT
SHOWED . THEREFORE, TURNED OFF NEOSYNEPHRINE AND CONTINUED TAKING BP
WITH BOTH ARTERIAL LINE AND NBP TO LEFT UPPER ARM. TRANSDUCER REMAINS AT
PHLEBOSTATIC AXIS AND BOTH ARTERIAL BP'S AND NBP'S ARE CLOSER TO RANGE THAN
BEFORE. WILL CONTINUE TO MONITOR BOTH FOR NOW. RECTAL TUBE HAS MINIMAL OUTPUT
THIS SHIFT. VALENTINE CATHETER IS DRAINING DARK LINA/GREEN COLORED URINE. PT IS
TOLERATING VHP AT 50MLS/HR WITH LOW RESIDUALS NOTED THIS SHIFT. WILL CONTINUE
TO MONITOR UNTIL REPORT IS HANDED OFF TO ONCOMING RN.

## 2021-01-15 NOTE — NUR
SHIFT SUMMARY:
PATIENT REMAINS SEDATED AND PARALYZED. TRIAL OF TITRATION OF PARALYTICS OFF
WAS NOT SUCCESSFUL. PATIENT STARTS TO DESATURATE AS SOON AS NIMBEX IS BELOW 3.
PROPOFOL REMAINS AT 60, NIMBEX AT 3.5, PRECIDEX RESTARTED AND AT .5. CT SCAN
DONE PER DR. HARTMAN. TUBE FEEDING CONTINUED, VALENTINE AND RECTAL TUBE STILL IN
PLACE. URINE OUTPUT REMAINS GOOD, BP'S STILL LABILE THROUGHOUT THE DAY.
PATIENT PRONED AFTER CT SCAN COMPLETE.

## 2021-01-15 NOTE — NUR
ASSUMING PT CARE:
PT INTUBATED, SEDATED, PARALYZED. PRONE. VENT: AC 28/300, 10/85%.
PROPFOL GTT @ 30mcg/kg/min, PRECEDEX 0.5mcg/kg/hr, NIMBEX 3.5mcg/kg/min.
PT UNRESPONSIVE TO VERBAL STIMULI, +SOFT GAG & WINCES W/ ORAL CARE. BIS
MONITOR 40s. LIMBS FLACCID. LS COARSE THROUGHOUT. TF @ 50ml/hr. RECTAL TUBE &
VALENTINE PATENT & DRAINING TO GRAVITY. SKIN APPEARS PWD.
SEE SHIFT ASSESSMENT.

## 2021-01-16 LAB
ANION GAP SERPL CALCULATED.4IONS-SCNC: 1 MMOL/L (ref 6–16)
BASOPHILS # BLD AUTO: 0.02 K/MM3 (ref 0–0.23)
BASOPHILS NFR BLD AUTO: 0 % (ref 0–2)
BUN SERPL-MCNC: 25 MG/DL (ref 8–24)
CALCIUM SERPL-MCNC: 7.6 MG/DL (ref 8.5–10.1)
CHLORIDE SERPL-SCNC: 98 MMOL/L (ref 98–108)
CO2 SERPL-SCNC: 38 MMOL/L (ref 21–32)
CREAT SERPL-MCNC: 0.54 MG/DL (ref 0.6–1.2)
DEPRECATED RDW RBC AUTO: 57.1 FL (ref 35.1–46.3)
EOSINOPHIL # BLD AUTO: 0 K/MM3 (ref 0–0.68)
EOSINOPHIL NFR BLD AUTO: 0 % (ref 0–6)
ERYTHROCYTE [DISTWIDTH] IN BLOOD BY AUTOMATED COUNT: 17.8 % (ref 11.7–14.2)
GLUCOSE SERPL-MCNC: 190 MG/DL (ref 70–99)
HCT VFR BLD AUTO: 24.9 % (ref 37–53)
HGB BLD-MCNC: 8 G/DL (ref 13.5–17.5)
IMM GRANULOCYTES # BLD AUTO: 0.52 K/MM3 (ref 0–0.1)
IMM GRANULOCYTES NFR BLD AUTO: 6 % (ref 0–1)
LYMPHOCYTES # BLD AUTO: 0.74 K/MM3 (ref 0.84–5.2)
LYMPHOCYTES NFR BLD AUTO: 8 % (ref 21–46)
MAGNESIUM SERPL-MCNC: 2.1 MG/DL (ref 1.6–2.4)
MCHC RBC AUTO-ENTMCNC: 32.1 G/DL (ref 31.5–36.5)
MCV RBC AUTO: 88 FL (ref 80–100)
MONOCYTES # BLD AUTO: 0.36 K/MM3 (ref 0.16–1.47)
MONOCYTES NFR BLD AUTO: 4 % (ref 4–13)
NEUTROPHILS # BLD AUTO: 7.41 K/MM3 (ref 1.96–9.15)
NEUTROPHILS NFR BLD AUTO: 82 % (ref 41–73)
NRBC # BLD AUTO: 0.03 K/MM3 (ref 0–0.02)
NRBC BLD AUTO-RTO: 0.3 /100 WBC (ref 0–0.2)
PLATELET # BLD AUTO: 186 K/MM3 (ref 150–400)
POTASSIUM SERPL-SCNC: 4.6 MMOL/L (ref 3.5–5.5)
SODIUM SERPL-SCNC: 137 MMOL/L (ref 136–145)

## 2021-01-16 NOTE — NUR
2ND DOSE OF LABETALOL 20MG BROUGHT PT OUT OF A FIB/FLUTTER IMMEDIATELY. PT NOW
SINUS RHTHYM, RATE 70'S.

## 2021-01-16 NOTE — NUR
PT PRONED AT 1330 WITH THE ASSISTANCE OF ANOTHER RN AND RT. PT PLACED PRONE
W/O DIFFICULTY. PRECEDEX, FENT, NIMBEX, AND PROPOFOL REMAIN AT SAME RATE. AM
ASSESSMENT ESSENTIALLY UNCHANGED FROM THIS AM OTHER THAN PEEP INCREASED TO 12.
FIO2 REMAINS %. PT TOLERATING TUBE FEEDS; RATE INCREASED TO 45CC/HR.
POWERGLIDE PLACED TO ALMA. PT'S MOTHER IN TO SEE HER SON; FULL UPDATE GIVEN.
RHTHYM SINUS W RATE 70-80'S. PT SOMEWHAT HTN. LOPRESSOR PT GIVEN AT 1300.

## 2021-01-16 NOTE — NUR
TURNING OFF NIMBEX AND SEDATIVES TEMP. TO ASSESS NEURO STATUS DISCUSSED W DR HARTMAN. WILL NOT TURN OFF THESE MEDS AT THIS TIME AS PT 02 IS NOT STABLE AT THIS
TIME.

## 2021-01-16 NOTE — NUR
RHTHYM CHANGED TO AFIB/FLUTTER W RATE 120-140'S. HTN. DR HARTMAN NOTIFIED.
LABETALOL 20MG GIVEN PER DR HARTMAN.

## 2021-01-16 NOTE — NUR
PT'S SATS DECREASED TO 85% DESPITE RAISE IN PEEP TO 12. PT PLACED IN FLAT
SUPINE POSITION; THIS IS THE ONLY POSITION THAT IMPROVED OXYGENATION. SATS NOW
92%; DR HARTMAN NOTIFIED.

## 2021-01-16 NOTE — NUR
BEDSIDE REPORT TAKEN AT 0700. PT SEDATED ON PROPOFOL AT 50MCG, PRECEDEX AT
0.5MCG, FENT GTT AT 75MCG/HR, AND ON NIMBEX AT 3.5MCG FOR VENT COMPLIANCE. PT
HAD SMALL MOVEMENT OF MOUTH AND SMALL GRIMACES W ORAL CARE. PT NOT BREATHING
OVER VENT. FIO2 DECREASED TO 80% AT 0800 BY RT, SATS >92%. WHEN PT URNED AT
0800, PEEK PRESSURE IMMEDIATELY INCREASED TO 45-48, PT SUCTIONED/NO
SECRETIONS. PEEK PRESSURES DECREASED TO 38 AT 0830. DURING THIS TIME BIS
MONITOR WENT FROM 38 TO 74 AND SATS STARTED TO DROP TO 85%. ATIVAN 2MG GIVEN
AND FIO2 INCREASED %. SHORTLY AFTER ATIVAN PT WENT INTO AFIB W RVR RATE
140'S; BP STABLE. DR FERNÁNDEZ AT BEDSIDE. LEBETALOL 40MG GIVEN PER MD. PT HAS NOW
CONVERTED BACK INTO SINUS RYTHYM W RATE 70'S; BP REMAINS STABLE.

## 2021-01-16 NOTE — NUR
ASSUMING PT CARE:
PT INTUBATED, SEDATED, PARALYED. VENT: AC 28/300, 12/90%. PROPOFOL GTT @
50mcg/kg/min, PRECEDEX 0.5mcg/kg/hr, NIMBEX 3.5mcg/kg/min, FENTANYL GTT @
75mcg/hr. BIS 40s. PT LAYING PRONE, TILTED TO THE L & IN SWIMMER'S POSITION.
TRANE OF 4, 4/4. PT IS SYNCHRONOUS W/ THE VENT. VALENTINE & RECTAL TUBE DRAINING
TO GRAVITY.
WILL CONTINUE TO MONITOR & REPORT AS APPROPRIATE.

## 2021-01-16 NOTE — NUR
SHIFT SUMMARY:
PT CONTINUES TO BE INTUBATED, SEDATED, & PARALYZED. VENT: AC 28/300, 10/90%.
PROPOFOL GTT @ 60mcg/kg/min, PRECEDEX 0.5mcg/kg/hr, NIMBEX 3.5mcg/kg/min. NO
CHANGES IN NEURO STATUS AS PREVIOUSLY DOCUMENTED. TRANE OF 4, 4/4. BIS
CONSISTENTLY 40s. PT W/ 3 SHORT EPISODES OF AFIB LAST NIGHT W/ HR IN THE 120s.
THESE EPISODES WERE SELF-RESOLVING & PT HAS BEEN IN NSR FOR SEVERAL HOURS
W/OUT ISSUE. TF PLACED ON HOLD FOR APPROX 3 HRS D/T RESIDUALS OF >400ml. TF
RESUMED @ 25ml/HR & RESIDUALS NOW 50, PT TOLERATING WELL. NO S/Sx OF GI
INTOLERANCE. BED BATH COMPLETE THIS SHIFT.  MORNING LOPRESSOR HELD D/T
BRADYCARDIA IN THE 50s & SBP <65. WILL PASS THIS ONTO MORNING RN TO DISCUSS
MED SCHEDULE W/ MD.

## 2021-01-17 LAB
ANION GAP SERPL CALCULATED.4IONS-SCNC: -1 MMOL/L (ref 6–16)
BASOPHILS # BLD AUTO: 0.02 K/MM3 (ref 0–0.23)
BASOPHILS NFR BLD AUTO: 0 % (ref 0–2)
BUN SERPL-MCNC: 30 MG/DL (ref 8–24)
CALCIUM SERPL-MCNC: 8.3 MG/DL (ref 8.5–10.1)
CHLORIDE SERPL-SCNC: 103 MMOL/L (ref 98–108)
CO2 SERPL-SCNC: 42 MMOL/L (ref 21–32)
CREAT SERPL-MCNC: 0.61 MG/DL (ref 0.6–1.2)
DEPRECATED RDW RBC AUTO: 57.3 FL (ref 35.1–46.3)
EOSINOPHIL # BLD AUTO: 0.01 K/MM3 (ref 0–0.68)
EOSINOPHIL NFR BLD AUTO: 0 % (ref 0–6)
ERYTHROCYTE [DISTWIDTH] IN BLOOD BY AUTOMATED COUNT: 17.8 % (ref 11.7–14.2)
GLUCOSE SERPL-MCNC: 147 MG/DL (ref 70–99)
HCT VFR BLD AUTO: 27.5 % (ref 37–53)
HGB BLD-MCNC: 7.9 G/DL (ref 13.5–17.5)
IMM GRANULOCYTES # BLD AUTO: 0.48 K/MM3 (ref 0–0.1)
IMM GRANULOCYTES NFR BLD AUTO: 4 % (ref 0–1)
LYMPHOCYTES # BLD AUTO: 1.22 K/MM3 (ref 0.84–5.2)
LYMPHOCYTES NFR BLD AUTO: 10 % (ref 21–46)
MCHC RBC AUTO-ENTMCNC: 28.7 G/DL (ref 31.5–36.5)
MCV RBC AUTO: 90 FL (ref 80–100)
MONOCYTES # BLD AUTO: 0.71 K/MM3 (ref 0.16–1.47)
MONOCYTES NFR BLD AUTO: 6 % (ref 4–13)
NEUTROPHILS # BLD AUTO: 9.45 K/MM3 (ref 1.96–9.15)
NEUTROPHILS NFR BLD AUTO: 79 % (ref 41–73)
NRBC # BLD AUTO: 0.06 K/MM3 (ref 0–0.02)
NRBC BLD AUTO-RTO: 0.5 /100 WBC (ref 0–0.2)
PH BLDV: 7.37 [PH] (ref 7.34–7.37)
PLATELET # BLD AUTO: 206 K/MM3 (ref 150–400)
POTASSIUM SERPL-SCNC: 4 MMOL/L (ref 3.5–5.5)
SODIUM SERPL-SCNC: 144 MMOL/L (ref 136–145)

## 2021-01-17 NOTE — NUR
ASSESSMENT/ASSUMED CARE
PT INTUBATED AND ON [facility] VENT. SEDATION WITH PROPOFOL, FENTANYL GTT AND
NIMBEX. TOF 4/4. BIS 40. LUNGS CLEAR BUT DECREASED IN THE BASES. VENT SETTINGS
AC 30  PEEP 12 FIO2 75% TO KEEP SPO2 >90%. SUCTIONED SCANT THIN CLEAR
SERECTIONS VIA ET TUBE. HEART RATE REGULAR 70'S. BP STABLE. BP HYPOACTIVE.
TUBE FEED VITAL HP AT GOAL RATE 50 ML/HR WITH WATER 30 ML Q4HR. RESIDUAL CHECK
10 ML REFED. RECTAL TUBE DRAINING LIQUID BROWN STOOL. YUDI CARE DONE AND TUBE
REPOSITIONED. VALENTINE CATH PATENT DRAINING YELLOW URINE. ORAL CARE DONE. PT
PRONE REPOSITIONED TO SWIMMERS POSITION WITH HEAD TO THE RIGHT. RIGHT ARM AND
RIGHT KNEE BENT UP WITH LEFT ARM DOWN. PILLOWS UNDER KNEES AND ANKLES. PILLOW
UNDER RIGHT SIDE. PICC LINE TO RIGHT UPPER ARM DRSG INTACT. 11 CM OUT. SITE
CLEAR. FENTANYL GTT AT 75 MCQ/HR, PROPOFOL AT 60 MCQ/KG/MIN, NIMBEX 3.5
MCQ/KG/MIN, PRECEDEX 0.7 MCQ/KG/HR AND NS AT 10 ML/HR. POWER GLIDE TO LEFT
UPPER ARM SALINE LOCKED, SITE CLEAR, FLUSHED WITHOUT DIFFICULTY.

## 2021-01-17 NOTE — NUR
LEVOPHED TURNED OFF AT 1530 AS PT BECAME HTN AFTER BEING PLACED PRONE. ATIVAN
2MG GIVEN FOR BIS 70'S, INCREASED HR, INCREASED BP. BIS NOW 40-50'S. BP, HR
IMPROVED. PROPOFOL IS AT 60MCG, PRECEDEX AT 0.7MCG, FENTANYL AT 75MCG/HR,
NIMBEX AT 3.5. PT REMAINS ADEQUATELY PARALYZED. PT HAS TOLERATED TF W MINIMAL
RESIDUALS. FIO2 AT 75%, SATS 92%. PT'S MOTHER STOPPED IN TODAY FOR SHORT
VISIT; UPDATE GIVEN.

## 2021-01-17 NOTE — NUR
NIMBEX TURNED OFF. PT APPEARED CALM, FOLLOW DIRECTIONS, ABLE TO LIGHTLY MOVE
BOTH HANDS TO COMMAND. NODDED HEAD NO TO PAIN. NODDING APPROPRIATELY TO
QUESTIONS. RESP RATE RAISED HIGH 40'S. SATS BEGAN TO FALL. PROPFOL INCREASED
TO 60, PRECEDEX UP TO 0.7, THIS DID NOT IMPROVE RATE. ATIVAN 2MG GIVEN W/O
IMPROVEMENT; SATS DECREASED TO 84%. AT THIS TIME NIMBEX RESTARTED AT 3.5MCG.
RT NOTIFIED. PT MAY NEED TO BE PRONED SHORTLY. DR HARTMAN NOTIFIED

## 2021-01-17 NOTE — NUR
PT IS ADEQUATELY PARALYZED. NIMBEX AT 3.5MCG. PT IS NOT BREATHING OVER VENT.
NO COUGH, GAG, SWALLOW. PT IS SYNCHRONOUS WITH VENT. SATS 97%, FIO2 90%.  WILL
CONTINUE TO TITRATE DOWN AS TOLERATED. NO MOVEMENT OF EXTREMITIES NOTED. PT IS
BRADICARDIC W RATE 50'S. BP IS IMPROVING W LEVOPHED AT 4MCG, MAP >65.

## 2021-01-17 NOTE — NUR
PT AWAKE, ATTEMPTS TO NOD HEAD TO QUESTIONS. UNABLE TO MOVE EXTREMITIES, WILL
CONT TO TITRATE NIMBEX OFF AS TOLERATED. SPO2 92-93%. PT REMAINS SYNCHRONOUS W
VENT.

## 2021-01-17 NOTE — NUR
HYPERTENSION
PT /85 HEART RATE 79, RECHECK 172/81 HEART RATE 78, MED WITH LABETALOL
40 MG IV FOR HYPERTENSION.

## 2021-01-17 NOTE — NUR
NIMBEX AT 3.5MCG. PRECEDEX INCREASED TO 1MCG, PER DR HARTMAN.  DR HARTMAN AT
BEDSIDE, GIVEN FULL REPORT. RT AT BEDSIDE; BITE BLOCK RE-TAPED. ETT IN GOOD
POSITION. PT'S SATS 90% % FIO2. PT BREATHING OVER VENT SLIGHTLY AT 32;
RATE SET AT 30.

## 2021-01-17 NOTE — NUR
DR HARTMAN AT BEDSIDE. RATE INCREASED TO 30, FIO2 DECREASED TO 80%. WILL ATTEMPT
TO WEAN ONLY NIMBEX DOWN AT THIS TIME PER DR HARTMAN. CDIFF +.

## 2021-01-17 NOTE — NUR
PT'S SATS % AT 1245. PT PRONED AT 1300 PER DR HARTMAN. PT'S BP STARTED TO
TREND DOWN W MAPS AROUND 50. PRECEDEX DECREASED TO 0.5MCG. DR HARTMAN NOTIFIED.
LEVOPHED STARTED AT 4MCG AT 1410.

## 2021-01-17 NOTE — NUR
BEDSIDE REPORT TAKEN AT 0700.  PT SEDATED AND PARALYZED FOR MECH VENT.
PROPOFOL WAS AT 60MCG BUT TURNED DOWN TO 50MCG AT 0700 FOR HYPOTENSION.
PRECEDEX AT 0.5MCG, FENT AT 75MCG/HR, NIMBEX AT 3.5MCG. PT SYNCHRONOUS WITH
VENT; IS NOT BREATHING OVER VENT. FIO2 AT 90%, PT'S SATS 96%. PEEP AT 12. RESP
RATE SET AT 28.  FOR POOR LUNG COMPLIANCE. PEAK PRESSURES STABLE AT THIS
TIME. PT IS IN AFIB W RVR; RATE 120-140'S. PT WITH RESOLVING HYPOTENSION. WILL
CALL DR HARTMAN FOR FURTHER ORDERS REGARDING AFIB/HYPOTENSION. STOOL SAMPLE SENT
FOR CDIFF.

## 2021-01-17 NOTE — NUR
SHIFT SUMMARY:
PT CONTINUES TO BE INTUBATED, SEDATED, & PARALYZED. VENT: AC 28/300 12/90.
PROPOFOL GTT @ 60mcg/kg/min, PRECEDEX 0.5mcg/kg/hr, NIMBEX 3.5mcG/kg/min,
FENTANYL 75mcg/hr. BIS W/ A CONSITENT RANGE OF 38-48 T/O SHIFT W/ INCREASES TO
THE 60s W/ PT CARE. NO CHANGE IN NEURO STATUS SINCE LAST ENTRY. PT IS
SYNCHRONOUS W/ THE VENT. FREQUENT EPISODES OF LOW TIDAL VOLUMES RESOLVED W/
ETT CUFF INFLATION. TRANE OF 4, 4/4. PT PRONED UNTIL 0100 & REMAINS SUPINE.
SINUS RHYTHM THROUGHOUT MOST OF THE SHIFT UNTIL APPROX 0530, PT CONTINUES TO
BE IN AFIB 117-130s. WILL CONTINUE TO MONITOR & MEDICATE AS APPROPRIATE.

## 2021-01-18 LAB
ANION GAP SERPL CALCULATED.4IONS-SCNC: 2 MMOL/L (ref 6–16)
BASOPHILS # BLD AUTO: 0.04 K/MM3 (ref 0–0.23)
BASOPHILS NFR BLD AUTO: 0 % (ref 0–2)
BUN SERPL-MCNC: 33 MG/DL (ref 8–24)
CALCIUM SERPL-MCNC: 8.2 MG/DL (ref 8.5–10.1)
CHLORIDE SERPL-SCNC: 100 MMOL/L (ref 98–108)
CO2 SERPL-SCNC: 43 MMOL/L (ref 21–32)
CREAT SERPL-MCNC: 0.67 MG/DL (ref 0.6–1.2)
DEPRECATED RDW RBC AUTO: 59.8 FL (ref 35.1–46.3)
EOSINOPHIL # BLD AUTO: 0.02 K/MM3 (ref 0–0.68)
EOSINOPHIL NFR BLD AUTO: 0 % (ref 0–6)
ERYTHROCYTE [DISTWIDTH] IN BLOOD BY AUTOMATED COUNT: 18.6 % (ref 11.7–14.2)
GLUCOSE SERPL-MCNC: 156 MG/DL (ref 70–99)
HCT VFR BLD AUTO: 29.6 % (ref 37–53)
HGB BLD-MCNC: 8.7 G/DL (ref 13.5–17.5)
IMM GRANULOCYTES # BLD AUTO: 0.49 K/MM3 (ref 0–0.1)
IMM GRANULOCYTES NFR BLD AUTO: 3 % (ref 0–1)
LYMPHOCYTES # BLD AUTO: 1.5 K/MM3 (ref 0.84–5.2)
LYMPHOCYTES NFR BLD AUTO: 11 % (ref 21–46)
MAGNESIUM SERPL-MCNC: 1.9 MG/DL (ref 1.6–2.4)
MCHC RBC AUTO-ENTMCNC: 29.4 G/DL (ref 31.5–36.5)
MCV RBC AUTO: 90 FL (ref 80–100)
MONOCYTES # BLD AUTO: 0.8 K/MM3 (ref 0.16–1.47)
MONOCYTES NFR BLD AUTO: 6 % (ref 4–13)
NEUTROPHILS # BLD AUTO: 11.43 K/MM3 (ref 1.96–9.15)
NEUTROPHILS NFR BLD AUTO: 80 % (ref 41–73)
NRBC # BLD AUTO: 0.06 K/MM3 (ref 0–0.02)
NRBC BLD AUTO-RTO: 0.4 /100 WBC (ref 0–0.2)
PHOSPHATE SERPL-MCNC: 3.4 MG/DL (ref 2.5–4.9)
PLATELET # BLD AUTO: 219 K/MM3 (ref 150–400)
POTASSIUM SERPL-SCNC: 3.6 MMOL/L (ref 3.5–5.5)
SODIUM SERPL-SCNC: 145 MMOL/L (ref 136–145)

## 2021-01-18 NOTE — NUR
SHIFT SUMMARY
PT CONT INTUBATED AND ON [facility] VENT. VENT SETTINGS AC 30  PEEP 12 FIO2
75%. PT UNPRONED AT 0100. LUNGS COARSE AND DECREASE IN THE BASES. SUCTIONED
SCANT AMT VIA ET TUBE. HEART RATE WAS SINUS RYTHM BUT CHANGED TO AFIB WITH
RVR. STARTED ON AMIODARONE AFTER 150 MG BOLUS GIVE. PT NOW BACK TO SR. BP
AFTER UNPRONING HYPOTENSIVE AND LEVOPHED STARTED. LEVOPHED TITRATE UP TO 8
MCQ/MIN NOW BACK DOWN TO 4 MCQ/MIN. BT HYPOACTIVE. TUBE FEED AT GOAL 50 ML/HR.
REPORT TO ON COMING NURSE

## 2021-01-18 NOTE — NUR
ASSESSMENT/ASSUMED CARE
PT CONT INTUBATED AND ON MECH VENT. LUNGS CLEAR BUT DECREASED. VENT SETTINGS
AC 30  PEEP 12 FIO2 80%. SUCTIONED SMALL AMT CLEAR THIN SECRETIONS VIA
ET TUBE. LARGE AMT ORAL SECRETIONS. ORAL CARE DONE. PT BITING TUBE WITH ORAL
CARE. NOT FOLLOWING INSTRUCTIONS. SEDATED ON PROPOFOL 60 MCQ/KG/MIN, FENTANYL
100 MCQ/HR AND PRECEDEX 0.4 MCQ/KG/MIN. BILAT WRIST RESTRAINTS ON. HEART RATE
90'S SINUS RHYTHM. BP STABLE AT THIS TIME WITH LEVOPHED ON STANDBY. WILL
RESTART LEVOPHED IF MAP BELOW 65. GENERAL DEPENDENT EDEMA. BT HYPOACTIVE. TUBE
FEED VITAL HP AT GOAL RATE 50 ML/HR WITH WATER 30 ML Q4HR. RESIDUAL 10 ML
REFED. VALENTINE CATH PATENT DRAINING CLEAR YELLOW URINE. RECTAL TUBE WITH LIQUID
BROWN STOOL IN TUBE, REPOSITIONED TUBE. PICC LINE TO RIGHT UPPER ARM WITH
FENTANYL 100 MCQ/HR, PROPOFOL 60 MCQ/KG/MIN, NS AT 10 ML/HR AND PRECEDEX AT
0.4 MCQ/KG/MIN. SITE CLEAR AND DRSG INTACT. POWER GLIDE LEFT UPPER ARM WITH
AMIODARONE AT 0.5 MG/HR, SITE CLEAR AND DRSG INTACT.

## 2021-01-18 NOTE — NUR
PARALYTIC VACATION/DR PIERRE ROUNDS
NIMBEX PLACED ON STANDBY FOR APPROX 45 MINUTES. PT ABLE TO SQUEEZE HANDS
BILATERALLY. DOES NOT MOVE TOES. SHAKES HEAD NO WHEN ASKED ABOUT PAIN. RR
INCREASED TO 38-42, O2 SATS DECREASED TO LOW 80'S. RT CALLED, FIO2 INCREASED
%. NIMBEX RESTARTED FOR SHORT PERIOD OF TIME. DISCUSSED c DR PIERRE,
NIMBEX PLACED ON STANDBY, FENTANYL INCREASED  MCG/HR. PT HR INCREASED,
AFIB, RATE 110-130. PHARMACY CALLED REGARDING POTENTIAL FOR PROLONGED QT D/T
CURRENT MEDS AND VORICONAZOLE. EKG DONE. WILL MONITOR CLOSELY.

## 2021-01-18 NOTE — NUR
ASSUMED CARE
BEDSIDE REPORT FROM SUBHASH MANCIA AT 0700. PT INTUBATED, SEDATED AND PARALYZED.
VENT SETTINGS AC 30/300/12/75%. PROPOFOL GTT 60 MCG/KG/MIN, PRECEDEX 0.7
MCG/KG/HR, AND FENTANYL 75 MCG/HR. BIS 40'S. NIMBEX 3.5 MCG/KG/MIN, TOF 4/4.
PT SYNCHRONOUS c VENT. AMIODORONE GTT 1 MCG/MIN. PT SB, RATE MID 50'S.
LEVOPHED GTT FOR MAP>65. LUNGS CLEAR. SCANT THIN CLEAR SECRETIONS THROUGH ETT.
ABD ROUND, SOFT, BT X 4. VHP AT GOAL OF 50 ML/HR c 30 ML FLUSHES q4 HR. 210 ML
RESIDUALS THIS AM. VALENTINE PATENT, DRAINING CLEAR YELLOW URINE TO GRAVITY.
RECTAL TUBE DRAINING BROWN LIQUID STOOL TO GRAVITY. DEPENDENT EDEMA TO HANDS,
ELEVATED ON PILLOWS. PICC TO RUE, DRESSING C/D/I, POWERGLIDE TO LUE, DRESSING
C/D/I. PLAN TO PRONE AT 1200. WILL CONTINUE TO MONITOR.

## 2021-01-18 NOTE — NUR
HYPOTENSION/AFIB/CALL TO MD
PT WENT TO AFIB WITH RVR AND BECAME MORE HYPOTENSIVE. NOTIFIED DR HARTMAN.
AMIODARONE BOLUS 150 MG GIVEN AND STARTED ON AMIODARONE GTT AT 1 MG/MIN.
LEVOPHED INCREASED TO 4MCQ/MIN. CXR DONE AND PT REPOSITIONED. LUNGS COARSE,
SUCTIONED SCANT AMT VIA ET TUBE, CLEAR AND THIN. HEART RATE 100-140'S. BT
HYPOACTIVE. RESIDUAL 170 ML REFED. PERICARE DONE AND RECTAL TUBE REPOSITIONED.
LABS DRAWN AND CLAVES CHANGED TO PICC AND POWER GLIDE.

## 2021-01-18 NOTE — NUR
HYPOTENSION
PT REPOSITIONED WITH RT. HYPOTENSION NOTED. RESTARTED LEVOPHED AT 2 MCQ/MIN.
WITH TITRATE TO KEEP MAP ABOVE 65.

## 2021-01-18 NOTE — NUR
SHIFT SUMMARY
PT REMAINED INTUBATED, VENT SETTINGS AC 30/300/12/70%. PROPOFOL GTT 60
MCG/KG/MIN, FENTANYL 100 MCG/HR. NIMBEX AND PRECEDEX TITRATED OFF THIS SHIFT.
PT WAS ABLE TO SQUEEZE HANDS THIS SHIFT. PRONED AT 1240. TOLERATED WELL.
OCCASIONAL DOUBLE STACKING ON VENT, MEDICATED c ATIVAN PRN. LUNGS CLEAR. LEVO
GTT CONTINUES AT 4 MCG/MIN, AMIODORONE GTT 0.5 MCG/MIN. PT ON ONE EPISODE OF
AFIB c RVR THIS SHIFT. TUBE FEEDS CONTINUE AT GOAL. VALENTINE PATENT, DRAINING TO
GRAVITY, GREEN YELLOW URINE OUT. RECTAL TUBE IN PLACE, OCCASIONAL LEAKING.
BANANA FLAKES ADDED THIS SHIFT. UPDATED MOTHER ON PHONE. WILL CONTINUE TO
MONITOR UNTIL REPORT TO ONCOMING NURSE.

## 2021-01-19 LAB
ANION GAP SERPL CALCULATED.4IONS-SCNC: 3 MMOL/L (ref 6–16)
BASOPHILS # BLD AUTO: 0.05 K/MM3 (ref 0–0.23)
BASOPHILS NFR BLD AUTO: 0 % (ref 0–2)
BUN SERPL-MCNC: 32 MG/DL (ref 8–24)
CALCIUM SERPL-MCNC: 8.2 MG/DL (ref 8.5–10.1)
CHLORIDE SERPL-SCNC: 98 MMOL/L (ref 98–108)
CO2 SERPL-SCNC: 43 MMOL/L (ref 21–32)
CREAT SERPL-MCNC: 0.62 MG/DL (ref 0.6–1.2)
DEPRECATED RDW RBC AUTO: 61.7 FL (ref 35.1–46.3)
EOSINOPHIL # BLD AUTO: 0.04 K/MM3 (ref 0–0.68)
EOSINOPHIL NFR BLD AUTO: 0 % (ref 0–6)
ERYTHROCYTE [DISTWIDTH] IN BLOOD BY AUTOMATED COUNT: 19.5 % (ref 11.7–14.2)
GLUCOSE SERPL-MCNC: 199 MG/DL (ref 70–99)
HCT VFR BLD AUTO: 28.7 % (ref 37–53)
HGB BLD-MCNC: 8.5 G/DL (ref 13.5–17.5)
IMM GRANULOCYTES # BLD AUTO: 0.72 K/MM3 (ref 0–0.1)
IMM GRANULOCYTES NFR BLD AUTO: 5 % (ref 0–1)
LYMPHOCYTES # BLD AUTO: 0.79 K/MM3 (ref 0.84–5.2)
LYMPHOCYTES NFR BLD AUTO: 5 % (ref 21–46)
MCHC RBC AUTO-ENTMCNC: 29.6 G/DL (ref 31.5–36.5)
MCV RBC AUTO: 90 FL (ref 80–100)
MONOCYTES # BLD AUTO: 0.58 K/MM3 (ref 0.16–1.47)
MONOCYTES NFR BLD AUTO: 4 % (ref 4–13)
NEUTROPHILS # BLD AUTO: 13.4 K/MM3 (ref 1.96–9.15)
NEUTROPHILS NFR BLD AUTO: 86 % (ref 41–73)
NRBC # BLD AUTO: 0.03 K/MM3 (ref 0–0.02)
NRBC BLD AUTO-RTO: 0.2 /100 WBC (ref 0–0.2)
PH BLDA: 7.4 [PH] (ref 7.35–7.45)
PLATELET # BLD AUTO: 191 K/MM3 (ref 150–400)
POTASSIUM SERPL-SCNC: 3.8 MMOL/L (ref 3.5–5.5)
SODIUM SERPL-SCNC: 144 MMOL/L (ref 136–145)

## 2021-01-19 NOTE — NUR
SHIFT SUMMARY
PT CONT INTUBATED AND ON [facility] VENT. CURRENT VENT SETTINGS AC 30  PEEP 12
FIO2 75%. CRITICAL PCO2 72.8 CALLED TO DR PIERRE, NO NEW ORDERS. LUNGS CLEAR
BUT DECREASED. PT UNPRONED AT 0100. HEART RATE SINUS THUY AT THIS TIME IN THE
50'S. AMIODARONE STOPPED THIS AM. BT CONT HYPOACTIVE. TUBE FEED AT GOAL. BP
STABLE ON LEVOPHED AT 3 MCQ/MIN. PICC LINE TO RIGHT UPPER ARM STABLE, POWER
GLIDE TO LEFT UPPER ARM STABLE. PT CURRENTLY SEDATED WITH PROPOFOL AT 60
MCQ/KG/MIN, PRECEDEX 0.4 MCQ/KG/MIN AND FENTANYL GTT  MCQ/HR. PT TURNED
Q2HR. REPORT TO ON COMING NURSE

## 2021-01-19 NOTE — NUR
DR PIERRE:
PROVIDER AT BEDSIDE TO EVAL THE PT. SHE HAS ADJUSTED VENT AC RATE FROM 30-28.
SHE WOULD LIKE THE PRECEDEX TO BE STOPPED & HAS D/C'd ORDER. NO OTHER CHANGES
AT THIS TIME.

## 2021-01-19 NOTE — NUR
ASSUMED CARE:
REPORT RECEIVED FROM SUBHASH DOUGLAS RN. ASSUMED CARE OF THIS PT AT APPROX 0700.
ON ASSESSMENT, THE PT IS RESTING QUIETLY. EYES OPEN SPONTANEOUSLY TO ANY PAUSE
IN SEDATION BUT PT IS NOT FOLLOWING DIRECTIONS AT THIS TIME. SEDATION PROVIDED
BY PROPOFOL & PRECEDEX, INFUSING PER EMAR W/ TITRATION NOTED IN FLOWSHEET.
MONITOR SHOWS SR W/ HR 50s, CONVERTED TO AFIB RVR W/ -150s AT 0820. BP
STABLE W/ LEVOPHED DRIP INFUSING PER EMAR, TITRATION IN FLOWSHEET. OGT W/ VHP
TUBE FEED INFUSING AT GOAL RATE OF 50 ML/HR, 30 ML H2O FLUSH Q4H. RESIDUALS
NOTED IN I&O. RECTAL TUBE PATENT/ DRAINING ORANGE LIQUID/PASTY STLS. TEMP
VALENTINE PATENT/ DRAINING YELLOW URINE. PT EDEMATOUS T/O, SKIN OVERALL CDI.
WILL CONTINUE TO MONITOR & UPDATE AS NEEDED.

## 2021-01-19 NOTE — NUR
UPDATE:
AT APPROX 1300 PT HAS BEEN PLACED IN PRONE POSITIONING W/ SLIGHT TILT ONTO L
HIP. 2 RNs, PCT & RT AT BEDSIDE DURING THIS TIME. PT TOLERATING VENT SETTINGS
WELL IN THIS POSITION & O2 SATS > 92%.

## 2021-01-19 NOTE — NUR
SHIFT SUMMARY:
PT REMAINS INTUBATED & SEDATED W/ PROPOFOL, PRECEDEX & FENTANYL DRIPS AS PER
EMAR - TITRATION NOTED IN FLOWSHEET. WHILE REPOSITIONING, PT BECAME
INCREASINGLY AGITATED & WAS RESISTANT TO TURNING HIS HEAD IN EITHER DIRECTION
WHILE PRONED. 100 MCG FENTANYL GIVEN PER EMAR W/ RESOLUTION OF ANXIETY. THIS
WAS DISCUSSED W/ DR PIERRE AT THE TIME OF INCIDENCE & ORDERS PLACED FOR
FENTANYL DRIP SO THAT RATE OF FENTANYL INFUSION CAN BE INCREASED 
MCG/HR. LS ARE DIM T/O, OCCASIONAL CRACKLES NOTED IN BASES. VENT SETTINGS: AC
28/300/12/70% W/ O2 SATS > 92% ON AVG. DESATS TO 88% WHEN AGITATED. MONITOR
SHOWS SR W/ HR 70-80s, BP LABILE W/ LEVOPHED CURRENTLY INFUSING AT 1 MCG/MIN.
LEVOPHED PLACED ON STANDBY NUMEROUS TIMES TODAY FOR HTN & RESTARTED FOR
HYPOTENSION. OGT W/ TUBE FEEDS AT GOAL - RESIDUAL NOTED IN I&O. RECTAL TUBE
PATENT/ DRAINING LIGHT BROWN PASTY/ LIQUID STLS. RECTAL TEMP PROBE IN PLACE.
VALENTINE PATENT/ DRAINING YELLOW URINE. PT IS EDEMATOUS, SKIN OVERALL CDI.
WILL CONTINUE TO MONITOR & REPORT OFF TO ONCOMING RN.

## 2021-01-20 LAB
ANION GAP SERPL CALCULATED.4IONS-SCNC: 2 MMOL/L (ref 6–16)
BASOPHILS # BLD AUTO: 0.04 K/MM3 (ref 0–0.23)
BASOPHILS # BLD: 0 K/MM3 (ref 0–0.23)
BASOPHILS NFR BLD AUTO: 0 % (ref 0–2)
BASOPHILS NFR BLD: 0 % (ref 0–2)
BUN SERPL-MCNC: 27 MG/DL (ref 8–24)
CALCIUM SERPL-MCNC: 8.2 MG/DL (ref 8.5–10.1)
CHLORIDE SERPL-SCNC: 98 MMOL/L (ref 98–108)
CO2 SERPL-SCNC: 43 MMOL/L (ref 21–32)
CREAT SERPL-MCNC: 0.62 MG/DL (ref 0.6–1.2)
DEPRECATED RDW RBC AUTO: 65.5 FL (ref 35.1–46.3)
EOSINOPHIL # BLD AUTO: 0.02 K/MM3 (ref 0–0.68)
EOSINOPHIL # BLD: 0 K/MM3 (ref 0–0.68)
EOSINOPHIL NFR BLD AUTO: 0 % (ref 0–6)
EOSINOPHIL NFR BLD: 0 % (ref 0–6)
ERYTHROCYTE [DISTWIDTH] IN BLOOD BY AUTOMATED COUNT: 19.9 % (ref 11.7–14.2)
GLUCOSE SERPL-MCNC: 203 MG/DL (ref 70–99)
HCT VFR BLD AUTO: 28.6 % (ref 37–53)
HGB BLD-MCNC: 8.4 G/DL (ref 13.5–17.5)
IMM GRANULOCYTES # BLD AUTO: 0.66 K/MM3 (ref 0–0.1)
IMM GRANULOCYTES NFR BLD AUTO: 5 % (ref 0–1)
LYMPHOCYTES # BLD AUTO: 0.88 K/MM3 (ref 0.84–5.2)
LYMPHOCYTES NFR BLD AUTO: 6 % (ref 21–46)
MCHC RBC AUTO-ENTMCNC: 29.4 G/DL (ref 31.5–36.5)
MCV RBC AUTO: 92 FL (ref 80–100)
METAMYELOCYTES # BLD MANUAL: 0.43 K/MM3 (ref 0–0)
METAMYELOCYTES NFR BLD MANUAL: 3 % (ref 0–0)
MONOCYTES # BLD AUTO: 0.62 K/MM3 (ref 0.16–1.47)
MONOCYTES # BLD: 0.57 K/MM3 (ref 0.16–1.47)
MONOCYTES NFR BLD AUTO: 4 % (ref 4–13)
MONOCYTES NFR BLD: 4 % (ref 4–13)
MYELOCYTES # BLD MANUAL: 0.28 K/MM3 (ref 0–0)
MYELOCYTES NFR BLD MANUAL: 2 % (ref 0–0)
NEUTROPHILS # BLD AUTO: 12.21 K/MM3 (ref 1.96–9.15)
NEUTROPHILS NFR BLD AUTO: 85 % (ref 41–73)
NEUTS BAND NFR BLD MANUAL: 1 % (ref 0–8)
NEUTS SEG # BLD MANUAL: 13.13 K/MM3 (ref 1.96–9.15)
NEUTS SEG NFR BLD MANUAL: 90 % (ref 41–73)
NRBC # BLD AUTO: 0.02 K/MM3 (ref 0–0.02)
NRBC BLD AUTO-RTO: 0.1 /100 WBC (ref 0–0.2)
PH BLDA: 7.37 [PH] (ref 7.35–7.45)
PLATELET # BLD AUTO: 203 K/MM3 (ref 150–400)
POTASSIUM SERPL-SCNC: 4.3 MMOL/L (ref 3.5–5.5)
SODIUM SERPL-SCNC: 143 MMOL/L (ref 136–145)
TOTAL CELLS COUNTED BLD: 100

## 2021-01-20 NOTE — NUR
DECISION FOR COMFORT CARE
PT. MOTHER AT BEDSIDE WITH PALLIATIVE CARE. AT THIS TIME FAMILY MADE THE
DECISION TO WITHDRAW CARE AND CHANGE PT TO COMOFORT CARE. DR. PIERRE NOTIFIED.
PLANS FOR EXTUBATION TO COMFORT CARE THIS PM.

## 2021-01-20 NOTE — NUR
BEDSIDE REPORT TAKEN THIS AM. PT ON PROPOFOL AT 65MCG, PRECEDX AT 0.5MCG, AND
FENT GTT AT 150CC/HR FOR MECH VENT. PEEP 12, FIO2 75%. SATS BETWEEN 88-92%.
LUNGS COARSE T/O. PT OPNES EYES SPONTANIOUSLY. PT APPEARS TO NOD HEAD
APPROPRIATELY TO QUESTIONS. NODS HEAD NO TO PAIN AFTER THINKING ABOUT
QUESTION. PT ATTEMPTED TO GRASP HAND TO COMMAND BUT TOO PROFOUNDLY WEAK AT
THIS TIME TO DO SO. PT WENT BRIEFLY INTO AFIB W RVR; RATE 120-140 THIS AM.
THEN WAS BRADYCARDIC W RATE 50'S. PT CURRENTLY IN SINUS RHTHYM W RATE 60-70'S.
BP LABILE PT ON LEVOPHED AT 1MCG; WILL PLACE ON STANDBY. LOPRESSOR GIVEN AS
PT'S RHTYHM TENDS TO CHANGE TO AFIB W RVR WHEN HELD. DR PIERRE AT [facility] ETHIS
AM AND GIVEN FULL UPDATE. TF AT GOAL; RATE 50CC/HR W NO RESIDUALS THIS AM.

## 2021-01-20 NOTE — NUR
ASSUMED CARE
REPORT FROM SASHA MANCIA. PT. REMAINS SEDATED AND INTUBATED. PT OPENS EYES TO
VERBAL STIMULI, SHAKES HEAD NO TO PAIN, ATTEMPTS TO MOVE FEET WHEN ASKED. PT.
VENT SETTINGS OF AC 28, , PEEP 12, 75%. LS COARSE WITH MINIMAL
SECREATIONS FROM ETT. PT. CONTINUES WITH TUBE FEEDING, NO RESIDUALS AT THIS
TIME. TF INFUSING AT 50ML/HR WITH FLUSH OF 30ML Q4. PT. HAS PRECEDEX AT
0.5MCG/KG/MIN AND PROPOFOL AT 65 MCG/KG/MIN FOR SEDATION, ALONG WITH FENTNAYL
GTT AT 150MCG/HR. PER DR. PIERRE HOLD ON PRONING TODAY. FAMILY MEETING TO
HAPPEN WITH PALLIATIVE CARE AT 3PM. VSS AT THIS TIME PT REMAINS OFF OF
LEVOPHED GTT.

## 2021-01-20 NOTE — NUR
Family meeting held with parents at mom, Toña's request for approx one hour.
We reviewed pt's AD and family/sibling input, life review, quality of life
prior to pt's current acute resp failure. Mom reports that Pankaj has a very
low tolerance of frustration and was already very angry and frustrated with
changes to his health and body after going thru chemotherapy for testicular
cancer this Winter.  She verbalized an enormous feeling of guilt with either
decision she makes. Pankaj's father verbalized that Pankaj was extremely
unhappy, miserable, drinking heavily the last few years while living with
them. No one in the family felt he would have the desire, motivation or
tolerance that a prolonged rehab stay would require.  Toña attempted to say
several times what their decision was but finally said, "I can't say it out
loud". I asked her if she wanted me to say what she was trying to say and she
could tell me if I was correct. I asked if they had decided against a peg
tube and trach at this time. Both parents noded yes. I asked if they wanted
him to transition to comfort care and withdrawal of life support and they said
yes. They had already asked what would happen when if transitioned to comfort
care. Their many questions were answered and they were allowed to express
their thoughts and recounting of life review, their process of thinking and
weighing the burdens for their son. Both were appropriately tearful and deeply
pained over this decision that they never anticipated having to make.
They confirmed that they wanted their son removed from the ventilator
and medicated for comfort. I gave Pankaj's parents privacy and offered 
support. Dad declined  support. Toña had spoken with our 
previously and felt comforted by that and by chance henna Montero met us as
we were going in to ICU and followed her in for additional support. I spoke
with pt's nurses and Dr in ICU to convey the parents' wishes.  Parents were
offered time for any other family members to visit or for them to be with
Pankaj as long as they would like to. Dad initially did not want to go in. Mom
wanted to visit and knew she could remain nearby and return after ventilator
was d/c'd. Parents stated no other family would be coming to visit.

## 2021-01-20 NOTE — NUR
SHIFT SUMMARY:
PT REMAINS INTUBATED & SEDATED. VENT: AC 28/300, 12/75%. GTTs: PROPOFOL
65mcg/kg/min, PRECEDEX 0.5mcg/kg/hr, FENTANYL 150mcg/hr, LEVOPHED 3mcg/min.
WHEN PRONE, PT WAS EASILY AGITATED, BECOMING TACHYPNEIC, PULLING @ RESTRAINTS
& TURNING HEAD FROM SIDE TO SIDE. IVP FENTANYL & ATIVAN RESOLVED THIS FOR A
SHORT TIME BUT PT SEEMED TO HAVE MOST IMPROVEMENT AFTER BEING PLACED SUPINE. 1
EPISODE OF AFIB LAST NOC, SEE PREVIOUS RN NOTE. VENT SETTINGS REMAIN THE SAME,
PT WAS UNABLE TO TOLERATE ANY DEC IN FiO2. CONTINUES TO BREATHE SLIGHTLY OVER
THE VENT @ 30RR/MIN. NO ACUTE NEG CHANGES. 950cc URINE OUTPUT. NO SIGNIFICANT
OUTPUT IN RECTAL TUBE.
WILL CONTINUE TO MONITOR UNTIL REPORT OFF TO ONCOMING RN

## 2021-01-20 NOTE — NUR
PT EXTUBATED TO COMFORT, ETT DEEP SX PERFORMED WITH LAVAGE FOR COPIOUS THICK
PALE YELLOW, DEEP ORAL SX PERFORMED. PT EXTUBATED AT 1727 WITHOUT ISSUE.

## 2021-01-20 NOTE — NUR
UPDATE:
@ 0115 PT REPOSITIONED SUPINE, LEANING ON L SIDE. PT WAS PRE-MEDICATED W/ IVP
FENTANYL & TOLERATED WELL. NO REMARKABLE CHANGES IN VS. AFTER REPOSITIONING,
PT APPEARS TO TOLERATE THE VENT BETTER W/ LESS FREQUENT EPISODES OF AGITATION,
TACHYPNEA, TACHYCARDIA. PT NOW IN A SINUS RHYTHM W/ HR 50s-60s.
BEFORE REPOSITIONING PT HAD A BRIEF EPISODE OF AFIB W/ -137. @ THAT TIME
PT REQUIRED PRN ATIVAN & FENTANYL FOR AGITATION & HAD MUCH DIFFICULTY
TOLERATING ETT, DESPITE REPOSITIONING OF NECK & HEAD REST.
WILL CONTINUE TO MONITOR & REPORT AS APPROPRIATE.

## 2021-01-20 NOTE — NUR
Spiritual care note:
 
Present with Mom, Toña, throughout the day.  Stayed with Uriel throughout
extubation.  Toña and Yannick arrived and Daleparis passed peacefully about 30
minutes later.  Both tearful, but appropriate.  Several prayers provided at
Toña's request.  Parents responded well to gentle end-of-life education,
bereavement , and guidance.  They have selected [place] for
arrangements.  Provided my contact information for future grief .

## 2021-01-20 NOTE — NUR
DR. PIERRE AT BEDSIDE
ATTEMPTED TO WEAN DOWN PT SEDATION AND TRIAL PT ON SPONT MODE WITH PRESSURE
SUPPORT OF 12. PT. TVS AROUND 375 AND SHAKES HEAD YES TO FEELING HARDER TO
BREATHE. PT. RESTLESS IN THE BED SHAKING HIS HEAD. SPO2 BEGAN TO DECREASE TO
85% AFTER APPROX 10 MIN. 2MG ATIVAN GIVEN AT THIS TIME TO ATTEMPT TO EASE PT
WORK OF BREATHING ON VENT. PER DR. PIERRE SEDATION RETURNED TO PROP AT RATE OF
65MCG/KG/MIN AND PRECEDEX INCREASED TO 0.7MCG/KG/MIN. ATTEMPT TO ADJUST PT TVS
, AND PT DID NOT TOLERATE. VENT SETTINGS RETURNED TO AC 28, , PEEP
12 WITH FIO2 INCREASED TO 90% PER DR. PIERRE. RT NOTIFIED OF CHANGES.

## 2021-01-20 NOTE — NUR
PT EXTUBATED AT THIS TIME.
MEDICATED WITH ATIVAN AND MORPHINE UPON EXTUBATION PER  ORDER. TO FOLLOW
COMFORT CARE ORDER SET AND MEDICATE AS NEEDED. PT FAMILY UPDATED.